# Patient Record
Sex: FEMALE | Race: WHITE | Employment: FULL TIME
[De-identification: names, ages, dates, MRNs, and addresses within clinical notes are randomized per-mention and may not be internally consistent; named-entity substitution may affect disease eponyms.]

---

## 2017-02-27 ENCOUNTER — OFFICE VISIT (OUTPATIENT)
Dept: OBGYN | Facility: CLINIC | Age: 37
End: 2017-02-27

## 2017-02-27 VITALS
DIASTOLIC BLOOD PRESSURE: 74 MMHG | SYSTOLIC BLOOD PRESSURE: 110 MMHG | HEART RATE: 76 BPM | BODY MASS INDEX: 31.32 KG/M2 | HEIGHT: 65 IN | RESPIRATION RATE: 14 BRPM | WEIGHT: 188 LBS

## 2017-02-27 DIAGNOSIS — Z11.51 SPECIAL SCREENING EXAMINATION FOR HUMAN PAPILLOMAVIRUS (HPV): ICD-10-CM

## 2017-02-27 DIAGNOSIS — Z01.419 ENCOUNTER FOR GYNECOLOGICAL EXAMINATION (GENERAL) (ROUTINE) WITHOUT ABNORMAL FINDINGS: ICD-10-CM

## 2017-02-27 DIAGNOSIS — Z01.419 WELL WOMAN EXAM WITH ROUTINE GYNECOLOGICAL EXAM: Primary | ICD-10-CM

## 2017-02-27 PROCEDURE — 99395 PREV VISIT EST AGE 18-39: CPT | Performed by: ADVANCED PRACTICE MIDWIFE

## 2017-02-27 ASSESSMENT — PATIENT HEALTH QUESTIONNAIRE - PHQ9
1. LITTLE INTEREST OR PLEASURE IN DOING THINGS: 0
SUM OF ALL RESPONSES TO PHQ9 QUESTIONS 1 & 2: 0
SUM OF ALL RESPONSES TO PHQ QUESTIONS 1-9: 0
2. FEELING DOWN, DEPRESSED OR HOPELESS: 0

## 2017-03-09 ENCOUNTER — TELEPHONE (OUTPATIENT)
Dept: OBGYN | Facility: CLINIC | Age: 37
End: 2017-03-09

## 2017-03-10 DIAGNOSIS — Z11.51 SPECIAL SCREENING EXAMINATION FOR HUMAN PAPILLOMAVIRUS (HPV): ICD-10-CM

## 2017-03-10 DIAGNOSIS — Z01.419 WELL WOMAN EXAM WITH ROUTINE GYNECOLOGICAL EXAM: ICD-10-CM

## 2017-03-27 ENCOUNTER — TELEPHONE (OUTPATIENT)
Dept: OBGYN CLINIC | Age: 37
End: 2017-03-27

## 2017-03-27 DIAGNOSIS — N92.6 MISSED MENSES: Primary | ICD-10-CM

## 2017-03-28 ENCOUNTER — HOSPITAL ENCOUNTER (OUTPATIENT)
Age: 37
Discharge: HOME | End: 2017-03-28
Payer: MEDICARE

## 2017-03-28 DIAGNOSIS — N92.6 MISSED MENSES: ICD-10-CM

## 2017-03-28 DIAGNOSIS — O36.80X0 ENCOUNTER TO DETERMINE FETAL VIABILITY OF PREGNANCY, NOT APPLICABLE OR UNSPECIFIED FETUS: Primary | ICD-10-CM

## 2017-03-28 LAB — HCG QUANTITATIVE: ABNORMAL IU/L

## 2017-03-28 PROCEDURE — 36415 COLL VENOUS BLD VENIPUNCTURE: CPT

## 2017-03-28 PROCEDURE — 84702 CHORIONIC GONADOTROPIN TEST: CPT

## 2017-04-03 ENCOUNTER — HOSPITAL ENCOUNTER (OUTPATIENT)
Dept: ULTRASOUND IMAGING | Age: 37
Discharge: HOME | End: 2017-04-03
Payer: MEDICARE

## 2017-04-03 DIAGNOSIS — O36.80X0 ENCOUNTER TO DETERMINE FETAL VIABILITY OF PREGNANCY, NOT APPLICABLE OR UNSPECIFIED FETUS: ICD-10-CM

## 2017-04-03 PROCEDURE — 76801 OB US < 14 WKS SINGLE FETUS: CPT

## 2017-04-19 RX ORDER — PYRIDOXINE HCL (VITAMIN B6) 50 MG
50 TABLET ORAL 2 TIMES DAILY
Qty: 60 TABLET | Refills: 1 | Status: SHIPPED | OUTPATIENT
Start: 2017-04-19 | End: 2017-05-22

## 2017-04-24 ENCOUNTER — HOSPITAL ENCOUNTER (OUTPATIENT)
Age: 37
Discharge: HOME | End: 2017-04-24
Payer: COMMERCIAL

## 2017-04-24 ENCOUNTER — HOSPITAL ENCOUNTER (OUTPATIENT)
Age: 37
Setting detail: SPECIMEN
Discharge: HOME | End: 2017-04-24
Payer: COMMERCIAL

## 2017-04-24 ENCOUNTER — TELEPHONE (OUTPATIENT)
Dept: OBGYN CLINIC | Age: 37
End: 2017-04-24

## 2017-04-24 ENCOUNTER — INITIAL PRENATAL (OUTPATIENT)
Dept: OBGYN CLINIC | Age: 37
End: 2017-04-24

## 2017-04-24 VITALS
DIASTOLIC BLOOD PRESSURE: 76 MMHG | WEIGHT: 185 LBS | BODY MASS INDEX: 30.79 KG/M2 | HEART RATE: 68 BPM | SYSTOLIC BLOOD PRESSURE: 118 MMHG

## 2017-04-24 DIAGNOSIS — Z3A.16 PREGNANCY WITH 16 COMPLETED WEEKS GESTATION: Primary | ICD-10-CM

## 2017-04-24 DIAGNOSIS — O09.529 AMA (ADVANCED MATERNAL AGE) MULTIGRAVIDA 35+, UNSPECIFIED TRIMESTER: ICD-10-CM

## 2017-04-24 DIAGNOSIS — Z87.59 HISTORY OF MISCARRIAGE: ICD-10-CM

## 2017-04-24 DIAGNOSIS — O09.91 HRP (HIGH RISK PREGNANCY), FIRST TRIMESTER: Primary | ICD-10-CM

## 2017-04-24 DIAGNOSIS — Z3A.10 10 WEEKS GESTATION OF PREGNANCY: ICD-10-CM

## 2017-04-24 DIAGNOSIS — R87.810 CERVICAL HIGH RISK HPV (HUMAN PAPILLOMAVIRUS) TEST POSITIVE: ICD-10-CM

## 2017-04-24 DIAGNOSIS — Z32.01 POSITIVE PREGNANCY TEST: ICD-10-CM

## 2017-04-24 DIAGNOSIS — R87.612 LGSIL ON PAP SMEAR OF CERVIX: ICD-10-CM

## 2017-04-24 DIAGNOSIS — O09.91 HRP (HIGH RISK PREGNANCY), FIRST TRIMESTER: ICD-10-CM

## 2017-04-24 PROBLEM — R79.89 LOW THYROID STIMULATING HORMONE (TSH) LEVEL: Status: ACTIVE | Noted: 2017-04-24

## 2017-04-24 LAB
-: ABNORMAL
ABO/RH: NORMAL
ABSOLUTE EOS #: 0 K/UL (ref 0–0.4)
ABSOLUTE LYMPH #: 0.9 K/UL (ref 1–4.8)
ABSOLUTE MONO #: 0.4 K/UL (ref 0.1–1.3)
AMORPHOUS: ABNORMAL
ANTIBODY SCREEN: NEGATIVE
BACTERIA: ABNORMAL
BASOPHILS # BLD: 0 %
BASOPHILS ABSOLUTE: 0 K/UL (ref 0–0.2)
BILIRUBIN URINE: ABNORMAL
CASTS UA: ABNORMAL /LPF
COLOR: ABNORMAL
COMMENT UA: ABNORMAL
CONTROL: ABNORMAL
CRYSTALS, UA: ABNORMAL /HPF
DIFFERENTIAL TYPE: ABNORMAL
EOSINOPHILS RELATIVE PERCENT: 0 %
EPITHELIAL CELLS UA: ABNORMAL /HPF
GLUCOSE BLD-MCNC: 109 MG/DL (ref 70–99)
GLUCOSE URINE: NEGATIVE
HCT VFR BLD CALC: 39.1 % (ref 36–46)
HEMOGLOBIN: 13.1 G/DL (ref 12–16)
HEPATITIS B SURFACE ANTIGEN: NONREACTIVE
HIV AG/AB: NONREACTIVE
KETONES, URINE: ABNORMAL
LEUKOCYTE ESTERASE, URINE: ABNORMAL
LYMPHOCYTES # BLD: 17 %
MCH RBC QN AUTO: 28.6 PG (ref 26–34)
MCHC RBC AUTO-ENTMCNC: 33.5 G/DL (ref 31–37)
MCV RBC AUTO: 85.5 FL (ref 80–100)
MONOCYTES # BLD: 6 %
MUCUS: ABNORMAL
NITRITE, URINE: NEGATIVE
OTHER OBSERVATIONS UA: ABNORMAL
PDW BLD-RTO: 13.9 % (ref 11.5–14.9)
PH UA: 5.5 (ref 5–8)
PLATELET # BLD: 187 K/UL (ref 150–450)
PLATELET ESTIMATE: ABNORMAL
PMV BLD AUTO: 8.1 FL (ref 6–12)
PREGNANCY TEST URINE, POC: POSITIVE
PROTEIN UA: ABNORMAL
RBC # BLD: 4.57 M/UL (ref 4–5.2)
RBC # BLD: ABNORMAL 10*6/UL
RBC UA: ABNORMAL /HPF
RENAL EPITHELIAL, UA: ABNORMAL /HPF
RUBV IGG SER QL: 152.2 IU/ML
SEG NEUTROPHILS: 77 %
SEGMENTED NEUTROPHILS ABSOLUTE COUNT: 4.2 K/UL (ref 1.3–9.1)
SPECIFIC GRAVITY UA: 1.03 (ref 1–1.03)
THYROXINE, FREE: 1.28 NG/DL (ref 0.93–1.7)
TRICHOMONAS: ABNORMAL
TSH SERPL DL<=0.05 MIU/L-ACNC: 0.24 MIU/L (ref 0.3–5)
TURBIDITY: ABNORMAL
URINE HGB: ABNORMAL
UROBILINOGEN, URINE: NORMAL
WBC # BLD: 5.5 K/UL (ref 3.5–11)
WBC # BLD: ABNORMAL 10*3/UL
WBC UA: ABNORMAL /HPF
YEAST: ABNORMAL

## 2017-04-24 PROCEDURE — 86901 BLOOD TYPING SEROLOGIC RH(D): CPT

## 2017-04-24 PROCEDURE — 87086 URINE CULTURE/COLONY COUNT: CPT

## 2017-04-24 PROCEDURE — 0500F INITIAL PRENATAL CARE VISIT: CPT | Performed by: NURSE PRACTITIONER

## 2017-04-24 PROCEDURE — 86762 RUBELLA ANTIBODY: CPT

## 2017-04-24 PROCEDURE — 87491 CHLMYD TRACH DNA AMP PROBE: CPT

## 2017-04-24 PROCEDURE — 82947 ASSAY GLUCOSE BLOOD QUANT: CPT

## 2017-04-24 PROCEDURE — 85025 COMPLETE CBC W/AUTO DIFF WBC: CPT

## 2017-04-24 PROCEDURE — 87389 HIV-1 AG W/HIV-1&-2 AB AG IA: CPT

## 2017-04-24 PROCEDURE — 84443 ASSAY THYROID STIM HORMONE: CPT

## 2017-04-24 PROCEDURE — 86850 RBC ANTIBODY SCREEN: CPT

## 2017-04-24 PROCEDURE — 87591 N.GONORRHOEAE DNA AMP PROB: CPT

## 2017-04-24 PROCEDURE — 87340 HEPATITIS B SURFACE AG IA: CPT

## 2017-04-24 PROCEDURE — 87070 CULTURE OTHR SPECIMN AEROBIC: CPT

## 2017-04-24 PROCEDURE — 84439 ASSAY OF FREE THYROXINE: CPT

## 2017-04-24 PROCEDURE — 36415 COLL VENOUS BLD VENIPUNCTURE: CPT

## 2017-04-24 PROCEDURE — 86900 BLOOD TYPING SEROLOGIC ABO: CPT

## 2017-04-24 PROCEDURE — 81001 URINALYSIS AUTO W/SCOPE: CPT

## 2017-04-24 RX ORDER — PROMETHAZINE HYDROCHLORIDE 25 MG/1
12.5 TABLET ORAL EVERY 6 HOURS PRN
Qty: 30 TABLET | Refills: 0 | Status: SHIPPED | OUTPATIENT
Start: 2017-04-24 | End: 2017-05-01

## 2017-04-24 RX ORDER — DOXYLAMINE SUCCINATE AND PYRIDOXINE HYDROCHLORIDE, DELAYED RELEASE TABLETS 10 MG/10 MG 10; 10 MG/1; MG/1
2 TABLET, DELAYED RELEASE ORAL 4 TIMES DAILY
Qty: 120 TABLET | Refills: 0 | Status: SHIPPED | OUTPATIENT
Start: 2017-04-24 | End: 2017-05-22

## 2017-04-24 RX ORDER — DOCUSATE SODIUM 100 MG/1
100 CAPSULE, LIQUID FILLED ORAL DAILY PRN
Qty: 30 CAPSULE | Refills: 2 | Status: SHIPPED | OUTPATIENT
Start: 2017-04-24 | End: 2017-05-22

## 2017-04-25 LAB
C TRACH DNA GENITAL QL NAA+PROBE: NEGATIVE
CULTURE: NORMAL
CULTURE: NORMAL
Lab: NORMAL
N. GONORRHOEAE DNA: NEGATIVE
SPECIMEN DESCRIPTION: NORMAL
SPECIMEN DESCRIPTION: NORMAL
STATUS: NORMAL

## 2017-04-27 LAB
CULTURE: NORMAL
Lab: NORMAL
SPECIMEN DESCRIPTION: NORMAL
SPECIMEN DESCRIPTION: NORMAL
STATUS: NORMAL

## 2017-05-11 ENCOUNTER — HOSPITAL ENCOUNTER (OUTPATIENT)
Age: 37
Discharge: HOME | End: 2017-05-11
Payer: MEDICARE

## 2017-05-11 ENCOUNTER — ROUTINE PRENATAL (OUTPATIENT)
Dept: PERINATAL CARE | Age: 37
End: 2017-05-11
Payer: COMMERCIAL

## 2017-05-11 VITALS
HEIGHT: 65 IN | RESPIRATION RATE: 16 BRPM | TEMPERATURE: 98.4 F | DIASTOLIC BLOOD PRESSURE: 76 MMHG | WEIGHT: 187 LBS | SYSTOLIC BLOOD PRESSURE: 112 MMHG | HEART RATE: 76 BPM | BODY MASS INDEX: 31.16 KG/M2

## 2017-05-11 DIAGNOSIS — O36.80X0 EXAMINATION TO DETERMINE FETAL VIABILITY OF PREGNANCY, NOT APPLICABLE OR UNSPECIFIED FETUS: ICD-10-CM

## 2017-05-11 DIAGNOSIS — Z3A.13 13 WEEKS GESTATION OF PREGNANCY: ICD-10-CM

## 2017-05-11 DIAGNOSIS — Z36.9 FIRST TRIMESTER SCREENING: ICD-10-CM

## 2017-05-11 DIAGNOSIS — O09.521 AMA (ADVANCED MATERNAL AGE) MULTIGRAVIDA 35+, FIRST TRIMESTER: Primary | ICD-10-CM

## 2017-05-11 PROCEDURE — 76813 OB US NUCHAL MEAS 1 GEST: CPT | Performed by: OBSTETRICS & GYNECOLOGY

## 2017-05-11 PROCEDURE — 99242 OFF/OP CONSLTJ NEW/EST SF 20: CPT | Performed by: OBSTETRICS & GYNECOLOGY

## 2017-05-11 PROCEDURE — 76801 OB US < 14 WKS SINGLE FETUS: CPT | Performed by: OBSTETRICS & GYNECOLOGY

## 2017-05-12 LAB
SEND OUT REPORT: NORMAL
TEST NAME: NORMAL

## 2017-05-22 ENCOUNTER — ROUTINE PRENATAL (OUTPATIENT)
Dept: OBGYN CLINIC | Age: 37
End: 2017-05-22

## 2017-05-22 VITALS
BODY MASS INDEX: 30.62 KG/M2 | WEIGHT: 184 LBS | HEART RATE: 72 BPM | DIASTOLIC BLOOD PRESSURE: 70 MMHG | SYSTOLIC BLOOD PRESSURE: 116 MMHG

## 2017-05-22 DIAGNOSIS — Z3A.14 14 WEEKS GESTATION OF PREGNANCY: Primary | ICD-10-CM

## 2017-05-22 PROCEDURE — 0502F SUBSEQUENT PRENATAL CARE: CPT | Performed by: ADVANCED PRACTICE MIDWIFE

## 2017-05-30 ENCOUNTER — HOSPITAL ENCOUNTER (OUTPATIENT)
Age: 37
Discharge: HOME | End: 2017-05-30
Payer: COMMERCIAL

## 2017-05-30 DIAGNOSIS — Z3A.14 14 WEEKS GESTATION OF PREGNANCY: ICD-10-CM

## 2017-05-30 PROCEDURE — 82105 ALPHA-FETOPROTEIN SERUM: CPT

## 2017-05-30 PROCEDURE — 36415 COLL VENOUS BLD VENIPUNCTURE: CPT

## 2017-06-02 LAB
AFP INTERPRETATION: NORMAL
AFP MOM: 0.63
AFP SPECIMEN: NORMAL
AFP: 17 NG/ML
DATE OF BIRTH: NORMAL
DATING METHOD: NORMAL
DETERMINED BY: NORMAL
DIABETIC: NO
DUE DATE: NORMAL
ESTIMATED DUE DATE: NORMAL
FAMILY HISTORY NTD: NO
GESTATIONAL AGE: 15.71 WEEKS
HISTORY OF ANEUPLOIDY?: NO
INSULIN REQ DIABETES: NO
LAST MENSTRUAL PERIOD: NORMAL
MATERNAL AGE AT EDD: 36.3 YR
MATERNAL WEIGHT: 184
NUMBER OF FETUSES: NORMAL
PATIENT WEIGHT: 184 LBS
PHYSICIAN: NORMAL
RACE (MATERNAL): NORMAL
RACE: NORMAL
ZZ NTE CLEAN UP: HISTORY: NO

## 2017-06-19 ENCOUNTER — ROUTINE PRENATAL (OUTPATIENT)
Dept: OBGYN CLINIC | Age: 37
End: 2017-06-19
Payer: COMMERCIAL

## 2017-06-19 VITALS
DIASTOLIC BLOOD PRESSURE: 70 MMHG | SYSTOLIC BLOOD PRESSURE: 114 MMHG | BODY MASS INDEX: 31.62 KG/M2 | HEART RATE: 74 BPM | WEIGHT: 190 LBS

## 2017-06-19 DIAGNOSIS — Z3A.18 18 WEEKS GESTATION OF PREGNANCY: Primary | ICD-10-CM

## 2017-06-19 PROCEDURE — 99213 OFFICE O/P EST LOW 20 MIN: CPT | Performed by: ADVANCED PRACTICE MIDWIFE

## 2017-06-29 ENCOUNTER — ROUTINE PRENATAL (OUTPATIENT)
Dept: PERINATAL CARE | Age: 37
End: 2017-06-29
Payer: COMMERCIAL

## 2017-06-29 VITALS
BODY MASS INDEX: 31.95 KG/M2 | WEIGHT: 192 LBS | TEMPERATURE: 98.4 F | DIASTOLIC BLOOD PRESSURE: 78 MMHG | RESPIRATION RATE: 16 BRPM | HEART RATE: 72 BPM | SYSTOLIC BLOOD PRESSURE: 118 MMHG

## 2017-06-29 DIAGNOSIS — O28.9 ABNORMAL FIRST TRIMESTER SCREEN: ICD-10-CM

## 2017-06-29 DIAGNOSIS — O09.522 AMA (ADVANCED MATERNAL AGE) MULTIGRAVIDA 35+, SECOND TRIMESTER: Primary | ICD-10-CM

## 2017-06-29 DIAGNOSIS — O44.02 LOW IMPLANTATION OF PLACENTA WITHOUT HEMORRHAGE, SECOND TRIMESTER: ICD-10-CM

## 2017-06-29 DIAGNOSIS — Z36.86 ENCOUNTER FOR SCREENING FOR RISK OF PRE-TERM LABOR: ICD-10-CM

## 2017-06-29 PROBLEM — O44.40 LOW IMPLANTATION OF PLACENTA WITHOUT HEMORRHAGE: Status: ACTIVE | Noted: 2017-06-29

## 2017-06-29 PROCEDURE — 76817 TRANSVAGINAL US OBSTETRIC: CPT | Performed by: OBSTETRICS & GYNECOLOGY

## 2017-06-29 PROCEDURE — 76811 OB US DETAILED SNGL FETUS: CPT | Performed by: OBSTETRICS & GYNECOLOGY

## 2017-06-30 PROBLEM — O28.9 ABNORMAL FIRST TRIMESTER SCREEN: Status: ACTIVE | Noted: 2017-06-30

## 2017-07-06 ENCOUNTER — TELEPHONE (OUTPATIENT)
Dept: OBGYN CLINIC | Age: 37
End: 2017-07-06

## 2017-07-20 ENCOUNTER — ROUTINE PRENATAL (OUTPATIENT)
Dept: OBGYN CLINIC | Age: 37
End: 2017-07-20
Payer: COMMERCIAL

## 2017-07-20 VITALS
BODY MASS INDEX: 31.12 KG/M2 | DIASTOLIC BLOOD PRESSURE: 80 MMHG | WEIGHT: 187 LBS | SYSTOLIC BLOOD PRESSURE: 118 MMHG | HEART RATE: 68 BPM

## 2017-07-20 DIAGNOSIS — Z98.890 HISTORY OF INDUCED ABORTION: ICD-10-CM

## 2017-07-20 DIAGNOSIS — O44.00: ICD-10-CM

## 2017-07-20 DIAGNOSIS — Z67.91 RH NEGATIVE STATUS DURING PREGNANCY, UNSPECIFIED TRIMESTER: ICD-10-CM

## 2017-07-20 DIAGNOSIS — O09.92 HRP (HIGH RISK PREGNANCY), SECOND TRIMESTER: Primary | ICD-10-CM

## 2017-07-20 DIAGNOSIS — Z3A.23 23 WEEKS GESTATION OF PREGNANCY: ICD-10-CM

## 2017-07-20 DIAGNOSIS — Z87.59 HISTORY OF MISCARRIAGE: ICD-10-CM

## 2017-07-20 DIAGNOSIS — O26.899 RH NEGATIVE STATUS DURING PREGNANCY, UNSPECIFIED TRIMESTER: ICD-10-CM

## 2017-07-20 DIAGNOSIS — R87.612 LGSIL ON PAP SMEAR OF CERVIX: ICD-10-CM

## 2017-07-20 DIAGNOSIS — O28.9 ABNORMAL FIRST TRIMESTER SCREEN: ICD-10-CM

## 2017-07-20 PROCEDURE — 99213 OFFICE O/P EST LOW 20 MIN: CPT | Performed by: ADVANCED PRACTICE MIDWIFE

## 2017-07-27 ENCOUNTER — ROUTINE PRENATAL (OUTPATIENT)
Dept: PERINATAL CARE | Age: 37
End: 2017-07-27
Payer: COMMERCIAL

## 2017-07-27 VITALS
BODY MASS INDEX: 32.99 KG/M2 | HEIGHT: 65 IN | SYSTOLIC BLOOD PRESSURE: 118 MMHG | TEMPERATURE: 98 F | DIASTOLIC BLOOD PRESSURE: 82 MMHG | WEIGHT: 198 LBS | RESPIRATION RATE: 16 BRPM | HEART RATE: 91 BPM

## 2017-07-27 DIAGNOSIS — Z3A.24 24 WEEKS GESTATION OF PREGNANCY: ICD-10-CM

## 2017-07-27 DIAGNOSIS — O28.9 ABNORMAL FIRST TRIMESTER SCREEN: Primary | ICD-10-CM

## 2017-07-27 DIAGNOSIS — O35.8XX0 SUSPECTED DAMAGE TO FETUS FROM OTHER DISEASE IN MOTHER, AFFECTING MANAGEMENT OF MOTHER, ANTEPARTUM CONDITION OR COMPLICATION, NOT APPLICABLE OR UNSPECIFIED FETUS: ICD-10-CM

## 2017-07-27 DIAGNOSIS — O40.2XX0 POLYHYDRAMNIOS, ANTEPARTUM COMPLICATION, SECOND TRIMESTER, NOT APPLICABLE OR UNSPECIFIED FETUS: ICD-10-CM

## 2017-07-27 DIAGNOSIS — O44.02 LOW IMPLANTATION OF PLACENTA WITHOUT HEMORRHAGE, SECOND TRIMESTER: ICD-10-CM

## 2017-07-27 DIAGNOSIS — Z03.72 SUSPECTED PLACENTAL PROBLEM NOT FOUND: ICD-10-CM

## 2017-07-27 DIAGNOSIS — O09.522 AMA (ADVANCED MATERNAL AGE) MULTIGRAVIDA 35+, SECOND TRIMESTER: ICD-10-CM

## 2017-07-27 DIAGNOSIS — Z36.4 ANTENATAL SCREENING FOR FETAL GROWTH RETARDATION USING ULTRASONICS: ICD-10-CM

## 2017-07-27 PROBLEM — O40.9XX0 POLYHYDRAMNIOS, ANTEPARTUM COMPLICATION: Status: ACTIVE | Noted: 2017-07-27

## 2017-07-27 PROCEDURE — 76820 UMBILICAL ARTERY ECHO: CPT | Performed by: OBSTETRICS & GYNECOLOGY

## 2017-07-27 PROCEDURE — 76816 OB US FOLLOW-UP PER FETUS: CPT | Performed by: OBSTETRICS & GYNECOLOGY

## 2017-07-27 PROCEDURE — 76817 TRANSVAGINAL US OBSTETRIC: CPT | Performed by: OBSTETRICS & GYNECOLOGY

## 2017-07-27 PROCEDURE — 76825 ECHO EXAM OF FETAL HEART: CPT | Performed by: OBSTETRICS & GYNECOLOGY

## 2017-07-27 PROCEDURE — 93325 DOPPLER ECHO COLOR FLOW MAPG: CPT | Performed by: OBSTETRICS & GYNECOLOGY

## 2017-07-27 PROCEDURE — 76827 ECHO EXAM OF FETAL HEART: CPT | Performed by: OBSTETRICS & GYNECOLOGY

## 2017-07-28 ENCOUNTER — HOSPITAL ENCOUNTER (OUTPATIENT)
Age: 37
Discharge: HOME | End: 2017-07-28
Payer: COMMERCIAL

## 2017-07-28 LAB
GLUCOSE ADMINISTRATION: NORMAL
GLUCOSE TOLERANCE SCREEN 50G: 132 MG/DL (ref 70–135)
TOXOPLASM IGM: 0.25 INDEX
TOXOPLASMA BLOOD FOR RATIO: <0.5 IU/ML

## 2017-07-28 PROCEDURE — 86777 TOXOPLASMA ANTIBODY: CPT

## 2017-07-28 PROCEDURE — 86778 TOXOPLASMA ANTIBODY IGM: CPT

## 2017-07-28 PROCEDURE — 36415 COLL VENOUS BLD VENIPUNCTURE: CPT

## 2017-07-28 PROCEDURE — 86747 PARVOVIRUS ANTIBODY: CPT

## 2017-07-28 PROCEDURE — 86645 CMV ANTIBODY IGM: CPT

## 2017-07-28 PROCEDURE — 86644 CMV ANTIBODY: CPT

## 2017-07-28 PROCEDURE — 86658 ENTEROVIRUS ANTIBODY: CPT

## 2017-07-28 PROCEDURE — 82950 GLUCOSE TEST: CPT

## 2017-07-29 ENCOUNTER — APPOINTMENT (OUTPATIENT)
Dept: ULTRASOUND IMAGING | Age: 37
End: 2017-07-29
Payer: COMMERCIAL

## 2017-07-29 ENCOUNTER — HOSPITAL ENCOUNTER (OUTPATIENT)
Age: 37
Discharge: HOME | End: 2017-07-29
Attending: OBSTETRICS & GYNECOLOGY | Admitting: OBSTETRICS & GYNECOLOGY
Payer: COMMERCIAL

## 2017-07-29 VITALS
DIASTOLIC BLOOD PRESSURE: 79 MMHG | TEMPERATURE: 97.7 F | WEIGHT: 198 LBS | HEIGHT: 65 IN | RESPIRATION RATE: 16 BRPM | BODY MASS INDEX: 32.99 KG/M2 | HEART RATE: 73 BPM | SYSTOLIC BLOOD PRESSURE: 121 MMHG

## 2017-07-29 DIAGNOSIS — R10.9 ABDOMINAL PAIN AFFECTING PREGNANCY: Primary | ICD-10-CM

## 2017-07-29 DIAGNOSIS — O26.899 ABDOMINAL PAIN AFFECTING PREGNANCY: Primary | ICD-10-CM

## 2017-07-29 PROBLEM — K85.90 PANCREATITIS: Status: ACTIVE | Noted: 2017-07-29

## 2017-07-29 PROBLEM — A59.01 TRICHOMONAS VAGINITIS: Status: ACTIVE | Noted: 2017-07-29

## 2017-07-29 LAB
-: ABNORMAL
ABSOLUTE EOS #: 0 K/UL (ref 0–0.4)
ABSOLUTE LYMPH #: 1.3 K/UL (ref 1–4.8)
ABSOLUTE MONO #: 0.5 K/UL (ref 0.1–1.3)
ALBUMIN SERPL-MCNC: 3.3 G/DL (ref 3.5–5.2)
ALBUMIN/GLOBULIN RATIO: ABNORMAL (ref 1–2.5)
ALP BLD-CCNC: 85 U/L (ref 35–104)
ALT SERPL-CCNC: 9 U/L (ref 5–33)
AMORPHOUS: ABNORMAL
AMYLASE: 134 U/L (ref 28–100)
ANION GAP SERPL CALCULATED.3IONS-SCNC: 14 MMOL/L (ref 9–17)
AST SERPL-CCNC: 12 U/L
BACTERIA: ABNORMAL
BASOPHILS # BLD: 0 %
BASOPHILS ABSOLUTE: 0 K/UL (ref 0–0.2)
BILIRUB SERPL-MCNC: 0.27 MG/DL (ref 0.3–1.2)
BILIRUBIN URINE: NEGATIVE
BUN BLDV-MCNC: 5 MG/DL (ref 6–20)
BUN/CREAT BLD: ABNORMAL (ref 9–20)
CALCIUM SERPL-MCNC: 8.8 MG/DL (ref 8.6–10.4)
CASTS UA: ABNORMAL /LPF
CHLORIDE BLD-SCNC: 102 MMOL/L (ref 98–107)
CO2: 22 MMOL/L (ref 20–31)
COLOR: YELLOW
COMMENT UA: ABNORMAL
CREAT SERPL-MCNC: 0.51 MG/DL (ref 0.5–0.9)
CRYSTALS, UA: ABNORMAL /HPF
DIFFERENTIAL TYPE: NORMAL
DIRECT EXAM: ABNORMAL
EOSINOPHILS RELATIVE PERCENT: 0 %
EPITHELIAL CELLS UA: ABNORMAL /HPF
FETAL FIBRONECTIN APPEARANCE: NORMAL
FETAL FIBRONECTIN: NEGATIVE
GFR AFRICAN AMERICAN: >60 ML/MIN
GFR NON-AFRICAN AMERICAN: >60 ML/MIN
GFR SERPL CREATININE-BSD FRML MDRD: ABNORMAL ML/MIN/{1.73_M2}
GFR SERPL CREATININE-BSD FRML MDRD: ABNORMAL ML/MIN/{1.73_M2}
GLUCOSE BLD-MCNC: 88 MG/DL (ref 70–99)
GLUCOSE URINE: NEGATIVE
HCT VFR BLD CALC: 37.7 % (ref 36–46)
HEMOGLOBIN: 12.6 G/DL (ref 12–16)
KETONES, URINE: NEGATIVE
LEUKOCYTE ESTERASE, URINE: ABNORMAL
LIPASE: 106 U/L (ref 13–60)
LYMPHOCYTES # BLD: 14 %
Lab: ABNORMAL
MCH RBC QN AUTO: 30.4 PG (ref 26–34)
MCHC RBC AUTO-ENTMCNC: 33.4 G/DL (ref 31–37)
MCV RBC AUTO: 91 FL (ref 80–100)
MONOCYTES # BLD: 5 %
MUCUS: ABNORMAL
NITRITE, URINE: NEGATIVE
OTHER OBSERVATIONS UA: ABNORMAL
PDW BLD-RTO: 14.7 % (ref 11.5–14.9)
PH UA: 6 (ref 5–8)
PLATELET # BLD: 213 K/UL (ref 150–450)
PLATELET ESTIMATE: NORMAL
PMV BLD AUTO: 7.4 FL (ref 6–12)
POTASSIUM SERPL-SCNC: 3.7 MMOL/L (ref 3.7–5.3)
PROTEIN UA: ABNORMAL
RBC # BLD: 4.14 M/UL (ref 4–5.2)
RBC # BLD: NORMAL 10*6/UL
RBC UA: ABNORMAL /HPF
RENAL EPITHELIAL, UA: ABNORMAL /HPF
SEG NEUTROPHILS: 81 %
SEGMENTED NEUTROPHILS ABSOLUTE COUNT: 7.6 K/UL (ref 1.3–9.1)
SODIUM BLD-SCNC: 138 MMOL/L (ref 135–144)
SPECIFIC GRAVITY UA: 1.02 (ref 1–1.03)
SPECIMEN DESCRIPTION: ABNORMAL
SPECIMEN DESCRIPTION: ABNORMAL
STATUS: ABNORMAL
TOTAL PROTEIN: 6.6 G/DL (ref 6.4–8.3)
TRICHOMONAS: ABNORMAL
TURBIDITY: ABNORMAL
URINE HGB: ABNORMAL
UROBILINOGEN, URINE: NORMAL
WBC # BLD: 9.5 K/UL (ref 3.5–11)
WBC # BLD: NORMAL 10*3/UL
WBC UA: ABNORMAL /HPF
YEAST: ABNORMAL

## 2017-07-29 PROCEDURE — 76705 ECHO EXAM OF ABDOMEN: CPT

## 2017-07-29 PROCEDURE — 80053 COMPREHEN METABOLIC PANEL: CPT

## 2017-07-29 PROCEDURE — 87510 GARDNER VAG DNA DIR PROBE: CPT

## 2017-07-29 PROCEDURE — 82731 ASSAY OF FETAL FIBRONECTIN: CPT

## 2017-07-29 PROCEDURE — 6370000000 HC RX 637 (ALT 250 FOR IP): Performed by: OBSTETRICS & GYNECOLOGY

## 2017-07-29 PROCEDURE — 81001 URINALYSIS AUTO W/SCOPE: CPT

## 2017-07-29 PROCEDURE — 36415 COLL VENOUS BLD VENIPUNCTURE: CPT

## 2017-07-29 PROCEDURE — 83690 ASSAY OF LIPASE: CPT

## 2017-07-29 PROCEDURE — 85025 COMPLETE CBC W/AUTO DIFF WBC: CPT

## 2017-07-29 PROCEDURE — 87660 TRICHOMONAS VAGIN DIR PROBE: CPT

## 2017-07-29 PROCEDURE — 87491 CHLMYD TRACH DNA AMP PROBE: CPT

## 2017-07-29 PROCEDURE — 87591 N.GONORRHOEAE DNA AMP PROB: CPT

## 2017-07-29 PROCEDURE — 82150 ASSAY OF AMYLASE: CPT

## 2017-07-29 PROCEDURE — 87086 URINE CULTURE/COLONY COUNT: CPT

## 2017-07-29 PROCEDURE — 87480 CANDIDA DNA DIR PROBE: CPT

## 2017-07-29 RX ORDER — METRONIDAZOLE 500 MG/1
500 TABLET ORAL 2 TIMES DAILY
Qty: 14 TABLET | Refills: 0 | Status: SHIPPED | OUTPATIENT
Start: 2017-07-29 | End: 2017-08-05

## 2017-07-29 RX ORDER — METRONIDAZOLE 500 MG/1
2000 TABLET ORAL ONCE
Status: COMPLETED | OUTPATIENT
Start: 2017-07-29 | End: 2017-07-29

## 2017-07-29 RX ORDER — FLUCONAZOLE 150 MG/1
150 TABLET ORAL ONCE
Status: COMPLETED | OUTPATIENT
Start: 2017-07-29 | End: 2017-07-29

## 2017-07-29 RX ORDER — METRONIDAZOLE 500 MG/1
500 TABLET ORAL 2 TIMES DAILY
Qty: 20 TABLET | Refills: 0 | Status: SHIPPED | OUTPATIENT
Start: 2017-07-29 | End: 2017-07-29

## 2017-07-29 RX ADMIN — FLUCONAZOLE 150 MG: 150 TABLET ORAL at 10:36

## 2017-07-29 RX ADMIN — METRONIDAZOLE 2000 MG: 500 TABLET ORAL at 10:37

## 2017-07-30 LAB
CULTURE: NORMAL
CULTURE: NORMAL
Lab: NORMAL
PARVOVIRUS B19 IGG ANTIBODY: 7.17 IV
PARVOVIRUS B19 IGM ANTIBODY: 0.13 IV
SPECIMEN DESCRIPTION: NORMAL
SPECIMEN DESCRIPTION: NORMAL
STATUS: NORMAL

## 2017-07-31 PROBLEM — O44.40 LOW IMPLANTATION OF PLACENTA WITHOUT HEMORRHAGE: Status: RESOLVED | Noted: 2017-06-29 | Resolved: 2017-07-31

## 2017-07-31 LAB
COXSACKIE A9 AB: NORMAL
CYTOMEGALOVIRUS IGG ANTIBODY: 0.1

## 2017-08-01 LAB
C TRACH DNA GENITAL QL NAA+PROBE: NEGATIVE
N. GONORRHOEAE DNA: NEGATIVE

## 2017-08-03 LAB — CMV IGM: 0.2

## 2017-08-06 LAB
COXSACKIE B1 ANTIBODY: NORMAL
COXSACKIE B2 ANTIBODY: NORMAL
COXSACKIE B3 ANTIBODY: NORMAL
COXSACKIE B4 ANTIBODY: NORMAL
COXSACKIE B5 ANTIBODY: NORMAL
COXSACKIE B6 ANTIBODY: NORMAL

## 2017-08-09 ENCOUNTER — HOSPITAL ENCOUNTER (OUTPATIENT)
Age: 37
Discharge: HOME | End: 2017-08-09
Payer: COMMERCIAL

## 2017-08-09 LAB
ALBUMIN SERPL-MCNC: 3.4 G/DL (ref 3.5–5.2)
ALBUMIN/GLOBULIN RATIO: ABNORMAL (ref 1–2.5)
ALP BLD-CCNC: 86 U/L (ref 35–104)
ALT SERPL-CCNC: 10 U/L (ref 5–33)
ANION GAP SERPL CALCULATED.3IONS-SCNC: 15 MMOL/L (ref 9–17)
AST SERPL-CCNC: 12 U/L
BILIRUB SERPL-MCNC: 0.31 MG/DL (ref 0.3–1.2)
BUN BLDV-MCNC: 5 MG/DL (ref 6–20)
BUN/CREAT BLD: ABNORMAL (ref 9–20)
CALCIUM SERPL-MCNC: 9 MG/DL (ref 8.6–10.4)
CHLORIDE BLD-SCNC: 99 MMOL/L (ref 98–107)
CO2: 24 MMOL/L (ref 20–31)
CREAT SERPL-MCNC: 0.5 MG/DL (ref 0.5–0.9)
GFR AFRICAN AMERICAN: >60 ML/MIN
GFR NON-AFRICAN AMERICAN: >60 ML/MIN
GFR SERPL CREATININE-BSD FRML MDRD: ABNORMAL ML/MIN/{1.73_M2}
GFR SERPL CREATININE-BSD FRML MDRD: ABNORMAL ML/MIN/{1.73_M2}
GLUCOSE BLD-MCNC: 93 MG/DL (ref 70–99)
LIPASE: 108 U/L (ref 13–60)
POTASSIUM SERPL-SCNC: 4.3 MMOL/L (ref 3.7–5.3)
SODIUM BLD-SCNC: 138 MMOL/L (ref 135–144)
TOTAL PROTEIN: 6.5 G/DL (ref 6.4–8.3)

## 2017-08-09 PROCEDURE — 80053 COMPREHEN METABOLIC PANEL: CPT

## 2017-08-09 PROCEDURE — 83690 ASSAY OF LIPASE: CPT

## 2017-08-09 PROCEDURE — 36415 COLL VENOUS BLD VENIPUNCTURE: CPT

## 2017-08-22 ENCOUNTER — ROUTINE PRENATAL (OUTPATIENT)
Dept: OBGYN CLINIC | Age: 37
End: 2017-08-22
Payer: COMMERCIAL

## 2017-08-22 VITALS
SYSTOLIC BLOOD PRESSURE: 118 MMHG | WEIGHT: 196 LBS | HEART RATE: 64 BPM | DIASTOLIC BLOOD PRESSURE: 72 MMHG | BODY MASS INDEX: 32.62 KG/M2

## 2017-08-22 DIAGNOSIS — A59.01 TRICHOMONAS VAGINITIS: ICD-10-CM

## 2017-08-22 DIAGNOSIS — Z98.890 HISTORY OF INDUCED ABORTION: ICD-10-CM

## 2017-08-22 DIAGNOSIS — O28.9 ABNORMAL FIRST TRIMESTER SCREEN: ICD-10-CM

## 2017-08-22 DIAGNOSIS — O26.899 RH NEGATIVE STATUS DURING PREGNANCY, UNSPECIFIED TRIMESTER: ICD-10-CM

## 2017-08-22 DIAGNOSIS — O09.92 HRP (HIGH RISK PREGNANCY), SECOND TRIMESTER: Primary | ICD-10-CM

## 2017-08-22 DIAGNOSIS — Z3A.27 27 WEEKS GESTATION OF PREGNANCY: ICD-10-CM

## 2017-08-22 DIAGNOSIS — R87.612 LGSIL ON PAP SMEAR OF CERVIX: ICD-10-CM

## 2017-08-22 DIAGNOSIS — Z87.59 HISTORY OF MISCARRIAGE: ICD-10-CM

## 2017-08-22 DIAGNOSIS — Z67.91 RH NEGATIVE STATUS DURING PREGNANCY, UNSPECIFIED TRIMESTER: ICD-10-CM

## 2017-08-22 DIAGNOSIS — Z23 NEED FOR PROPHYLACTIC VACCINATION WITH DIPHTHERIA-TETANUS-PERTUSSIS WITH POLIOMYELITIS (DTP + POLIO) VACCINE: ICD-10-CM

## 2017-08-22 PROCEDURE — 90471 IMMUNIZATION ADMIN: CPT | Performed by: OBSTETRICS & GYNECOLOGY

## 2017-08-22 PROCEDURE — 99213 OFFICE O/P EST LOW 20 MIN: CPT | Performed by: OBSTETRICS & GYNECOLOGY

## 2017-08-22 PROCEDURE — 90715 TDAP VACCINE 7 YRS/> IM: CPT | Performed by: OBSTETRICS & GYNECOLOGY

## 2017-08-24 ENCOUNTER — HOSPITAL ENCOUNTER (OUTPATIENT)
Age: 37
Discharge: HOME | End: 2017-08-24
Attending: OBSTETRICS & GYNECOLOGY | Admitting: OBSTETRICS & GYNECOLOGY
Payer: COMMERCIAL

## 2017-08-24 ENCOUNTER — HOSPITAL ENCOUNTER (OUTPATIENT)
Age: 37
Discharge: HOME | End: 2017-08-24
Payer: COMMERCIAL

## 2017-08-24 ENCOUNTER — ROUTINE PRENATAL (OUTPATIENT)
Dept: PERINATAL CARE | Age: 37
End: 2017-08-24
Payer: COMMERCIAL

## 2017-08-24 VITALS
BODY MASS INDEX: 32.95 KG/M2 | HEART RATE: 92 BPM | DIASTOLIC BLOOD PRESSURE: 74 MMHG | RESPIRATION RATE: 18 BRPM | SYSTOLIC BLOOD PRESSURE: 118 MMHG | TEMPERATURE: 97.8 F | WEIGHT: 198 LBS

## 2017-08-24 DIAGNOSIS — Z3A.28 28 WEEKS GESTATION OF PREGNANCY: ICD-10-CM

## 2017-08-24 DIAGNOSIS — Z3A.27 27 WEEKS GESTATION OF PREGNANCY: ICD-10-CM

## 2017-08-24 DIAGNOSIS — O35.8XX0 SUSPECTED DAMAGE TO FETUS FROM OTHER DISEASE IN MOTHER, AFFECTING MANAGEMENT OF MOTHER, ANTEPARTUM CONDITION OR COMPLICATION, NOT APPLICABLE OR UNSPECIFIED FETUS: ICD-10-CM

## 2017-08-24 DIAGNOSIS — O28.9 ABNORMAL FIRST TRIMESTER SCREEN: Primary | ICD-10-CM

## 2017-08-24 DIAGNOSIS — O09.523 AMA (ADVANCED MATERNAL AGE) MULTIGRAVIDA 35+, THIRD TRIMESTER: ICD-10-CM

## 2017-08-24 DIAGNOSIS — Z13.89 ENCOUNTER FOR ROUTINE SCREENING FOR MALFORMATION USING ULTRASONICS: ICD-10-CM

## 2017-08-24 DIAGNOSIS — Z36.4 ANTENATAL SCREENING FOR FETAL GROWTH RETARDATION USING ULTRASONICS: ICD-10-CM

## 2017-08-24 DIAGNOSIS — O09.92 HRP (HIGH RISK PREGNANCY), SECOND TRIMESTER: ICD-10-CM

## 2017-08-24 LAB
-: ABNORMAL
ABSOLUTE EOS #: 0 K/UL (ref 0–0.4)
ABSOLUTE LYMPH #: 1.1 K/UL (ref 1–4.8)
ABSOLUTE MONO #: 0.5 K/UL (ref 0.1–1.3)
AMORPHOUS: ABNORMAL
BACTERIA: ABNORMAL
BASOPHILS # BLD: 0 %
BASOPHILS ABSOLUTE: 0 K/UL (ref 0–0.2)
BILIRUBIN URINE: ABNORMAL
CASTS UA: ABNORMAL /LPF
COLOR: ABNORMAL
COMMENT UA: ABNORMAL
CRYSTALS, UA: ABNORMAL /HPF
DIFFERENTIAL TYPE: NORMAL
EOSINOPHILS RELATIVE PERCENT: 0 %
EPITHELIAL CELLS UA: ABNORMAL /HPF
GLUCOSE URINE: NEGATIVE
HCT VFR BLD CALC: 37.4 % (ref 36–46)
HEMOGLOBIN: 12.6 G/DL (ref 12–16)
KETONES, URINE: ABNORMAL
LEUKOCYTE ESTERASE, URINE: ABNORMAL
LYMPHOCYTES # BLD: 13 %
MCH RBC QN AUTO: 30.5 PG (ref 26–34)
MCHC RBC AUTO-ENTMCNC: 33.7 G/DL (ref 31–37)
MCV RBC AUTO: 90.6 FL (ref 80–100)
MONOCYTES # BLD: 6 %
MUCUS: ABNORMAL
NITRITE, URINE: NEGATIVE
OTHER OBSERVATIONS UA: ABNORMAL
PDW BLD-RTO: 14.9 % (ref 11.5–14.9)
PH UA: 6 (ref 5–8)
PLATELET # BLD: 215 K/UL (ref 150–450)
PLATELET ESTIMATE: NORMAL
PMV BLD AUTO: 7.1 FL (ref 6–12)
PROTEIN UA: ABNORMAL
RBC # BLD: 4.12 M/UL (ref 4–5.2)
RBC # BLD: NORMAL 10*6/UL
RBC UA: ABNORMAL /HPF
RENAL EPITHELIAL, UA: ABNORMAL /HPF
SEG NEUTROPHILS: 81 %
SEGMENTED NEUTROPHILS ABSOLUTE COUNT: 7.3 K/UL (ref 1.3–9.1)
SPECIFIC GRAVITY UA: 1.02 (ref 1–1.03)
TRICHOMONAS: ABNORMAL
TURBIDITY: ABNORMAL
URINE HGB: ABNORMAL
UROBILINOGEN, URINE: NORMAL
WBC # BLD: 9.1 K/UL (ref 3.5–11)
WBC # BLD: NORMAL 10*3/UL
WBC UA: ABNORMAL /HPF
YEAST: ABNORMAL

## 2017-08-24 PROCEDURE — 76805 OB US >/= 14 WKS SNGL FETUS: CPT | Performed by: OBSTETRICS & GYNECOLOGY

## 2017-08-24 PROCEDURE — 86901 BLOOD TYPING SEROLOGIC RH(D): CPT

## 2017-08-24 PROCEDURE — 85025 COMPLETE CBC W/AUTO DIFF WBC: CPT

## 2017-08-24 PROCEDURE — 76820 UMBILICAL ARTERY ECHO: CPT | Performed by: OBSTETRICS & GYNECOLOGY

## 2017-08-24 PROCEDURE — 86900 BLOOD TYPING SEROLOGIC ABO: CPT

## 2017-08-24 PROCEDURE — 86850 RBC ANTIBODY SCREEN: CPT

## 2017-08-24 PROCEDURE — 76819 FETAL BIOPHYS PROFIL W/O NST: CPT | Performed by: OBSTETRICS & GYNECOLOGY

## 2017-08-24 PROCEDURE — 81001 URINALYSIS AUTO W/SCOPE: CPT

## 2017-08-24 PROCEDURE — 87086 URINE CULTURE/COLONY COUNT: CPT

## 2017-08-24 PROCEDURE — 36415 COLL VENOUS BLD VENIPUNCTURE: CPT

## 2017-08-24 PROCEDURE — 96372 THER/PROPH/DIAG INJ SC/IM: CPT

## 2017-08-24 PROCEDURE — 6360000002 HC RX W HCPCS: Performed by: OBSTETRICS & GYNECOLOGY

## 2017-08-24 RX ADMIN — HUMAN RHO(D) IMMUNE GLOBULIN 300 MCG: 1500 SOLUTION INTRAMUSCULAR; INTRAVENOUS at 10:34

## 2017-08-25 LAB
ABO/RH: NORMAL
ANTIBODY SCREEN: NEGATIVE
BLD PROD TYP BPU: NORMAL
BLD PROD TYP BPU: NORMAL
CULTURE: NORMAL
CULTURE: NORMAL
DISPENSE STATUS BLOOD BANK: NORMAL
HISTORY CHECK: NORMAL
Lab: 1
Lab: NORMAL
SPECIMEN DESCRIPTION: NORMAL
SPECIMEN DESCRIPTION: NORMAL
STATUS: NORMAL
TRANSFUSION STATUS: NORMAL
UNIT DIVISION: 0
UNIT NUMBER: NORMAL

## 2017-09-20 ENCOUNTER — ROUTINE PRENATAL (OUTPATIENT)
Dept: OBGYN CLINIC | Age: 37
End: 2017-09-20

## 2017-09-20 VITALS
DIASTOLIC BLOOD PRESSURE: 72 MMHG | HEART RATE: 86 BPM | SYSTOLIC BLOOD PRESSURE: 120 MMHG | WEIGHT: 199 LBS | BODY MASS INDEX: 33.12 KG/M2

## 2017-09-20 DIAGNOSIS — Z3A.31 31 WEEKS GESTATION OF PREGNANCY: Primary | ICD-10-CM

## 2017-09-20 PROCEDURE — 0502F SUBSEQUENT PRENATAL CARE: CPT | Performed by: ADVANCED PRACTICE MIDWIFE

## 2017-09-22 ENCOUNTER — ROUTINE PRENATAL (OUTPATIENT)
Dept: PERINATAL CARE | Age: 37
End: 2017-09-22
Payer: COMMERCIAL

## 2017-09-22 VITALS
WEIGHT: 200 LBS | RESPIRATION RATE: 20 BRPM | BODY MASS INDEX: 33.32 KG/M2 | HEIGHT: 65 IN | DIASTOLIC BLOOD PRESSURE: 82 MMHG | SYSTOLIC BLOOD PRESSURE: 115 MMHG | HEART RATE: 87 BPM | TEMPERATURE: 97.9 F

## 2017-09-22 DIAGNOSIS — O28.9 ABNORMAL FIRST TRIMESTER SCREEN: Primary | ICD-10-CM

## 2017-09-22 DIAGNOSIS — Z36.4 ANTENATAL SCREENING FOR FETAL GROWTH RETARDATION USING ULTRASONICS: ICD-10-CM

## 2017-09-22 DIAGNOSIS — O35.8XX0 SUSPECTED DAMAGE TO FETUS FROM OTHER DISEASE IN MOTHER, AFFECTING MANAGEMENT OF MOTHER, ANTEPARTUM CONDITION OR COMPLICATION, NOT APPLICABLE OR UNSPECIFIED FETUS: ICD-10-CM

## 2017-09-22 DIAGNOSIS — O09.523 AMA (ADVANCED MATERNAL AGE) MULTIGRAVIDA 35+, THIRD TRIMESTER: ICD-10-CM

## 2017-09-22 DIAGNOSIS — Z3A.32 32 WEEKS GESTATION OF PREGNANCY: ICD-10-CM

## 2017-09-22 PROCEDURE — 76820 UMBILICAL ARTERY ECHO: CPT | Performed by: OBSTETRICS & GYNECOLOGY

## 2017-09-22 PROCEDURE — 76816 OB US FOLLOW-UP PER FETUS: CPT | Performed by: OBSTETRICS & GYNECOLOGY

## 2017-09-22 PROCEDURE — 76818 FETAL BIOPHYS PROFILE W/NST: CPT | Performed by: OBSTETRICS & GYNECOLOGY

## 2017-10-04 ENCOUNTER — ROUTINE PRENATAL (OUTPATIENT)
Dept: OBGYN CLINIC | Age: 37
End: 2017-10-04
Payer: COMMERCIAL

## 2017-10-04 VITALS
WEIGHT: 202 LBS | SYSTOLIC BLOOD PRESSURE: 114 MMHG | BODY MASS INDEX: 33.61 KG/M2 | HEART RATE: 97 BPM | DIASTOLIC BLOOD PRESSURE: 78 MMHG

## 2017-10-04 DIAGNOSIS — O28.9 ABNORMAL FIRST TRIMESTER SCREEN: ICD-10-CM

## 2017-10-04 DIAGNOSIS — O40.9XX9: ICD-10-CM

## 2017-10-04 DIAGNOSIS — O09.93 HRP (HIGH RISK PREGNANCY), THIRD TRIMESTER: Primary | ICD-10-CM

## 2017-10-04 PROCEDURE — 0502F SUBSEQUENT PRENATAL CARE: CPT | Performed by: ADVANCED PRACTICE MIDWIFE

## 2017-10-04 PROCEDURE — 59025 FETAL NON-STRESS TEST: CPT | Performed by: ADVANCED PRACTICE MIDWIFE

## 2017-10-04 NOTE — MR AVS SNAPSHOT
After Visit Summary             Nataliya Alegria   10/4/2017 9:00 AM   Routine Prenatal    Description:  Female : 1980   Provider:  Sue Weaver CNM; JESSY QUINTERO   Department:  [id number] [facility] Gynecology              Your Follow-Up and Future Appointments         Below is a list of your follow-up and future appointments. This may not be a complete list as you may have made appointments directly with providers that we are not aware of or your providers may have made some for you. Please call your providers to confirm appointments. It is important to keep your appointments. Please bring your current insurance card, photo ID, co-pay, and all medication bottles to your appointment. If self-pay, payment is expected at the time of service. Your To-Do List     Future Appointments Provider Department Dept Phone    10/6/2017 10:00 AM ULTRASONOGRAPHER [address] [facility] Maternal Fetal Med [phone]    10/10/2017 10:00 AM Henrique Davis DO; JESSY Rashid VIA [facility] Obstetrics & Gynecology [phone]    Patients are asked to arrive 15 minutes prior to scheduled appointment time to complete paper work. 10/17/2017 10:45 AM Henrique Davis Via [employer]  Obstetrics & Gynecology [phone]    Patients are asked to arrive 15 minutes prior to scheduled appointment time to complete paper work. 10/24/2017 10:15 AM Henrique Davis Via [employer] [facility] [facility] Obstetrics & Gynecology [phone]    Patients are asked to arrive 15 minutes prior to scheduled appointment time to complete paper work. 10/31/2017 10:45 AM Henrique Davis, Via [employer] [facility] Obstetrics & Gynecology [phone]    Patients are asked to arrive 15 minutes prior to scheduled appointment time to complete paper work.     2018 9:00 AM Sue Weaver, [address] [facility] Obstetrics & Gynecology [phone]         Information from Your Visit        Department Name Address Phone Fax    [address] [facility]. [address] [phone]      You Were Seen for:         Comments    HRP (high risk pregnancy), third trimester   [[id number]]         Vital Signs     Blood Pressure Pulse Weight Last Menstrual Period Body Mass Index Smoking Status    114/78 97 202 lb (91.6 kg) 2017 33.61 kg/m2 Never Smoker      Additional Information about your Body Mass Index (BMI)           Your BMI as listed above is considered obese (30 or more). BMI is an estimate of body fat, calculated from your height and weight. The higher your BMI, the greater your risk of heart disease, high blood pressure, type 2 diabetes, stroke, gallstones, arthritis, sleep apnea, and certain cancers. BMI is not perfect. It may overestimate body fat in athletes and people who are more muscular. Even a small weight loss (between 5 and 10 percent of your current weight) by decreasing your calorie intake and becoming more physically active will help lower your risk of developing or worsening diseases associated with obesity. Learn more at: tab ticketbroker.uk             Medications and Orders      Your Current Medications Are              Prenatal Multivit-Min-Fe-FA (PRENATAL VITAMINS) 0.8 MG TABS Take 1 tablet by mouth daily      Allergies           No Known Allergies         Additional Information        Basic Information     Date Of Birth Sex Race Ethnicity Preferred Language    1980 Female White Non-/Non  English      Problem List as of 10/4/2017  Date Reviewed: 10/4/2017                Abnormal first trimester screen, elevated free Beta-hCG level     11/10/14 - M, Apg 8/9, Wt ?     High Risk Pregnancy    Rh negative status during pregnancy    History of induced     History of miscarriage    Mild Pancreatitis (Lipase 106)    Hx of Trichomonas in current pregnancy

## 2017-10-04 NOTE — PROGRESS NOTES
REACTIVE NST  CATEGORY 1 TRACING  Moderate Variability  Baseline of 140 bpm  SEE SCANNED DOCUMENT  RTO 2-5 DAYS FOR A FOLLOW UP APPOINTMENT WITH  TESTING. Lucía Frank is a 40 y.o. female 33w6d        OB History    Para Term  AB Living   5 1 1 0 3 1   SAB TAB Ectopic Molar Multiple Live Births   2 1    1      # Outcome Date GA Lbr Joe/2nd Weight Sex Delivery Anes PTL Lv   5 Current            4 Term 11/10/14 40w0d  7 lb 2.6 oz (3.25 kg) M Vag-Spont EPI  JAEL      Birth Comments: 14 1829    Education information given to mother and she verbalizes understanding about the following:      Infant security. Patient safety. Skin to Skin Contact for You and Your Baby. Hour for family beginnings. Benefits of breastfeeding. 3 2005           2 2000           1 TAB 1998                  Vitals  BP: 114/78  Weight: 202 lb (91.6 kg)  Pulse: 97  Patient Position: Sitting  Albumin: Trace  Glucose: Negative      The patient was seen and evaluated. There was positive fetal movements. No contractions or leakage of fluid. Signs and symptoms of  labor as well as labor were reviewed. The S/S of Pre-Eclampsia were reviewed with the patient in detail. She is to report any of these if they occur. She currently denies any of these. The patient had her 28 week labs completed. 17 Pt given Tdap      T-Dap Vaccine (27-36 weeks): completed    The patient was instructed on fetal kick counts and was given a kick sheet to complete every 8 hours. She was instructed that the baby should move at a minimum of ten times within one hour after a meal. The patient was instructed to lay down on her left side twenty minutes after eating and count movements for up to one hour with a target value of ten movements. She was instructed to notify the office if she did not make that target after two attempts or if after any attempt there was less than four movements.     The patient placenta; therefore they have the same genes as the baby. This test is 98% accurate, but can carry a slightly higher risk of miscarriage than amniocentesis, since the procedure is done in early pregnancy. Amniocentesis is a procedure where a sample of fluid is removed from the amniotic sac for analysis and evaluation. During this procedure, fluid is removed by placing a long needle through the abdominal wall into the amniotic sac. The amniocentesis needle is typically guided into the sac with the help of ultrasound imaging. Once the needle is in the sac, a syringe is used to withdraw the clear mervat-colored amniotic fluid, which looks a bit like urine. NIPT, utilizes the maternal blood to test for fetal cells. These fetal cells are then karyotyped for genetic evaluation. All of these tests, CVS, NIPT and amniocentesis, let couples know if the fetus will have genetic abnormalities, and will help them make informed decisions regarding their pregnancy. Karyotyping by either CVS, NIPT or Amniocentesis is the ONLY way to confirm the genetic chromosomal structure regarding trisomy; Down's Syndrome, Edward's or Pateau's. [facility] was reviewed. If NIPT is positive then a confirmatory amniocentesis would be recommended to confirm the diagnosis. 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 HRP (high risk pregnancy), third trimester  [place] FETAL NON-STRESS TEST   2. Abnormal first trimester screen, elevated free Beta-hCG level  [place]   3. Polyhydramnios, antepartum complication, unspecified trimester, other fetus  [place] F[place] NON[place]STRESS TEST             Plan:  The patient will return to the office for her next visit in 1 weeks. See antepartum flow sheet.    Has MFM appointment 10/6/2017

## 2017-10-06 ENCOUNTER — ROUTINE PRENATAL (OUTPATIENT)
Dept: PERINATAL CARE | Age: 37
End: 2017-10-06
Payer: COMMERCIAL

## 2017-10-06 VITALS
BODY MASS INDEX: 33.49 KG/M2 | TEMPERATURE: 98.2 F | WEIGHT: 201 LBS | DIASTOLIC BLOOD PRESSURE: 83 MMHG | HEART RATE: 104 BPM | HEIGHT: 65 IN | RESPIRATION RATE: 16 BRPM | SYSTOLIC BLOOD PRESSURE: 116 MMHG

## 2017-10-06 DIAGNOSIS — O09.523 AMA (ADVANCED MATERNAL AGE) MULTIGRAVIDA 35+, THIRD TRIMESTER: ICD-10-CM

## 2017-10-06 DIAGNOSIS — O28.9 ABNORMAL FIRST TRIMESTER SCREEN: ICD-10-CM

## 2017-10-06 DIAGNOSIS — O35.8XX0 SUSPECTED DAMAGE TO FETUS FROM OTHER DISEASE IN MOTHER, AFFECTING MANAGEMENT OF MOTHER, ANTEPARTUM CONDITION OR COMPLICATION, NOT APPLICABLE OR UNSPECIFIED FETUS: ICD-10-CM

## 2017-10-06 DIAGNOSIS — Z3A.34 34 WEEKS GESTATION OF PREGNANCY: ICD-10-CM

## 2017-10-06 DIAGNOSIS — O28.9 ABNORMAL FIRST TRIMESTER SCREEN: Primary | ICD-10-CM

## 2017-10-06 DIAGNOSIS — O43.93 PLACENTAL DISORDER, THIRD TRIMESTER: ICD-10-CM

## 2017-10-06 PROBLEM — O43.90 PLACENTAL DISORDER: Status: ACTIVE | Noted: 2017-10-06

## 2017-10-06 PROCEDURE — 76819 FETAL BIOPHYS PROFIL W/O NST: CPT | Performed by: OBSTETRICS & GYNECOLOGY

## 2017-10-06 PROCEDURE — 76820 UMBILICAL ARTERY ECHO: CPT | Performed by: OBSTETRICS & GYNECOLOGY

## 2017-10-06 PROCEDURE — 76815 OB US LIMITED FETUS(S): CPT | Performed by: OBSTETRICS & GYNECOLOGY

## 2017-10-06 NOTE — MR AVS SNAPSHOT
After Visit Summary             Nicole Alegria   10/6/2017 10:00 AM   Routine Prenatal    Description:  Female : 1980   Provider:  Natacha Stark MD   Department:  [facility]              Your Follow-Up and Future Appointments         Below is a list of your follow-up and future appointments. This may not be a complete list as you may have made appointments directly with providers that we are not aware of or your providers may have made some for you. Please call your providers to confirm appointments. It is important to keep your appointments. Please bring your current insurance card, photo ID, co-pay, and all medication bottles to your appointment. If self-pay, payment is expected at the time of service. Your To-Do List     Future Appointments Provider Department Dept Phone    10/10/2017 10:00 AM Carlos Kulkarni DO; Rayvon Kussmaul, [place] [address] [address] VIA [facility] Obstetrics & Gynecology [phone]    Patients are asked to arrive 15 minutes prior to scheduled appointment time to complete paper work. 10/13/2017 9:00 AM NURSE SCHEDULE, P JAMES MATERNAL FETAL; ULTRASONOGRAPHER Elvia [facility] Maternal Fetal Med [phone]    10/17/2017 10:45 AM Darcus Gravel, Via [place] [facility] Obstetrics & Gynecology [phone]    Patients are asked to arrive 15 minutes prior to scheduled appointment time to complete paper work. 10/20/2017 9:15 AM NURSE SCHEDULE, [facility] JAMES MATERNAL FETAL; ULTRASONOGRAPHER Elvia 48 Maternal Fetal Med [phone]    10/24/2017 10:15 AM Darcus Gravel, Via [place] [facility] Obstetrics & Gynecology [phone]    Patients are asked to arrive 15 minutes prior to scheduled appointment time to complete paper work.     10/27/2017 9:00 AM NURSE SCHEDULE, [facility] MATERNAL FETAL; ULTRASONOGRAPHER Elvia [facility] Maternal Fetal Med [phone]    10/31/2017 10:45 AM Darcus Gravel, Via [place] [facility] [address] Obstetrics &

## 2017-10-11 ENCOUNTER — TELEPHONE (OUTPATIENT)
Dept: OBGYN CLINIC | Age: 37
End: 2017-10-11

## 2017-10-13 ENCOUNTER — ROUTINE PRENATAL (OUTPATIENT)
Dept: PERINATAL CARE | Age: 37
End: 2017-10-13
Payer: COMMERCIAL

## 2017-10-13 VITALS
DIASTOLIC BLOOD PRESSURE: 85 MMHG | HEART RATE: 105 BPM | SYSTOLIC BLOOD PRESSURE: 119 MMHG | BODY MASS INDEX: 33.82 KG/M2 | RESPIRATION RATE: 16 BRPM | WEIGHT: 203 LBS | HEIGHT: 65 IN | TEMPERATURE: 97.5 F

## 2017-10-13 DIAGNOSIS — O99.213 OBESITY COMPLICATING PREGNANCY, THIRD TRIMESTER: ICD-10-CM

## 2017-10-13 DIAGNOSIS — O09.523 AMA (ADVANCED MATERNAL AGE) MULTIGRAVIDA 35+, THIRD TRIMESTER: ICD-10-CM

## 2017-10-13 DIAGNOSIS — O28.9 ABNORMAL FIRST TRIMESTER SCREEN: Primary | ICD-10-CM

## 2017-10-13 DIAGNOSIS — O35.8XX0 SUSPECTED DAMAGE TO FETUS FROM OTHER DISEASE IN MOTHER, AFFECTING MANAGEMENT OF MOTHER, ANTEPARTUM CONDITION OR COMPLICATION, NOT APPLICABLE OR UNSPECIFIED FETUS: ICD-10-CM

## 2017-10-13 DIAGNOSIS — Z3A.35 35 WEEKS GESTATION OF PREGNANCY: ICD-10-CM

## 2017-10-13 DIAGNOSIS — Z36.4 ANTENATAL SCREENING FOR FETAL GROWTH RETARDATION USING ULTRASONICS: ICD-10-CM

## 2017-10-13 PROCEDURE — 76816 OB US FOLLOW-UP PER FETUS: CPT | Performed by: OBSTETRICS & GYNECOLOGY

## 2017-10-13 PROCEDURE — 76818 FETAL BIOPHYS PROFILE W/NST: CPT | Performed by: OBSTETRICS & GYNECOLOGY

## 2017-10-13 PROCEDURE — 76820 UMBILICAL ARTERY ECHO: CPT | Performed by: OBSTETRICS & GYNECOLOGY

## 2017-10-17 ENCOUNTER — ROUTINE PRENATAL (OUTPATIENT)
Dept: OBGYN CLINIC | Age: 37
End: 2017-10-17
Payer: COMMERCIAL

## 2017-10-17 VITALS
WEIGHT: 203 LBS | DIASTOLIC BLOOD PRESSURE: 70 MMHG | SYSTOLIC BLOOD PRESSURE: 114 MMHG | HEART RATE: 76 BPM | BODY MASS INDEX: 33.78 KG/M2

## 2017-10-17 DIAGNOSIS — Z67.91 RH NEGATIVE STATUS DURING PREGNANCY, UNSPECIFIED TRIMESTER: ICD-10-CM

## 2017-10-17 DIAGNOSIS — Z3A.35 35 WEEKS GESTATION OF PREGNANCY: ICD-10-CM

## 2017-10-17 DIAGNOSIS — O09.93 HIGH-RISK PREGNANCY IN THIRD TRIMESTER: Primary | ICD-10-CM

## 2017-10-17 DIAGNOSIS — R79.89 LOW THYROID STIMULATING HORMONE (TSH) LEVEL: ICD-10-CM

## 2017-10-17 DIAGNOSIS — O28.9 ABNORMAL FIRST TRIMESTER SCREEN: ICD-10-CM

## 2017-10-17 DIAGNOSIS — R87.612 LGSIL ON PAP SMEAR OF CERVIX: ICD-10-CM

## 2017-10-17 DIAGNOSIS — O09.523 AMA (ADVANCED MATERNAL AGE) MULTIGRAVIDA 35+, THIRD TRIMESTER: ICD-10-CM

## 2017-10-17 DIAGNOSIS — O26.899 RH NEGATIVE STATUS DURING PREGNANCY, UNSPECIFIED TRIMESTER: ICD-10-CM

## 2017-10-17 DIAGNOSIS — O40.9XX9: ICD-10-CM

## 2017-10-17 PROCEDURE — 59025 FETAL NON-STRESS TEST: CPT | Performed by: OBSTETRICS & GYNECOLOGY

## 2017-10-17 PROCEDURE — 0502F SUBSEQUENT PRENATAL CARE: CPT | Performed by: OBSTETRICS & GYNECOLOGY

## 2017-10-17 NOTE — PROGRESS NOTES
Kate Major is a 40 y.o. female 35w5d        OB History    Para Term  AB Living   5 1 1 0 3 1   SAB TAB Ectopic Molar Multiple Live Births   2 1       1      # Outcome Date GA Lbr Joe/2nd Weight Sex Delivery Anes PTL Lv   5 Current            4 Term 11/10/14 40w0d  7 lb 2.6 oz (3.25 kg) M Vag-Spont EPI  JAEL      Birth Comments: 14 1829    Education information given to mother and she verbalizes understanding about the following:      Infant security. Patient safety. Skin to Skin Contact for You and Your Baby. Hour for family beginnings. Benefits of breastfeeding. 3 SAB            2 2000           1 TAB                     Vitals  BP: 114/70  Weight: 203 lb (92.1 kg)  Pulse: 76  Patient Position: Sitting  Albumin: Negative  Glucose: Negative      The patient was seen and evaluated. There was positive fetal movements. No contractions or leakage of fluid. Signs and symptoms of labor were reviewed. The S/S of Pre-Eclampsia were reviewed with the patient in detail. She is to report any of these if they occur. She currently denies any of these. The patient was instructed on fetal kick counts and was given a kick sheet to complete every 8 hours. She was instructed that the baby should move at a minimum of ten times within one hour after a meal. The patient was instructed to lay down on her left side twenty minutes after eating and count movements for up to one hour with a target value of ten movements. She was instructed to notify the office if she did not make that target after two attempts or if after any attempt there was less than four movements. The patient reports that the targets have been made Yes.    17 Pt given Tdap      T-Dap Vaccine (27-36 weeks) Completed: Yes    Allergies: Allergies as of 10/17/2017    (No Known Allergies)       Group Beta Strep collection was completed.  No next visit  Sensitivities for clindamycin and erythromycin were ordered. No      The patient was counseled on the mandatory call ahead policy. She has been instructed to call the office at anytime prior to going into the hospital so the on-call physician may direct her to the appropriate facility for care. Exceptions were reviewed including but not limited to: Decreased fetal movement, vaginal Bleeding or hemorrhage, trauma, readily expectant delivery, or any instance where she feels 911 should be utilized. The patient was counseled on Labor & Delivery. Route of delivery and counseling on vaginal, operative vaginal, and  sections were completed with the risks of each to both the patient as well as her baby. The possibility of a blood transfusion was discussed as well. The patient was not opposed to receiving a transfusion if needed. The patient was counseled on types of analgesia during labor and is considering either Regional or IV medication the risks, benefits and alternatives were discussed. Assessment:  1. Royal Anguiano is a 40 y.o. female  2. [id number]  3. 35w5d      Patient Active Problem List    Diagnosis Date Noted    Abnormal first trimester screen, elevated free Beta-hCG level 2017     Priority: High     Overview Note:     Fetal echo scheduled.        11/10/14 - M, Apg 8/9, Wt ? 11/10/2014     Priority: High    High Risk Pregnancy 2014     Priority: High     Overview Note:     Rh-/RI/GBS neg      Rh negative status during pregnancy 2014     Priority: High     Overview Note:     [place] at 28 weeks      History of induced       Priority: Low    History of miscarriage      Priority: Low    Placental disorder 10/06/2017    Mild Pancreatitis (Lipase 106) 2017     Overview Note:     Mild Pancreatitis likely secondary to cholithiasis                        -RUQ shows no evidence concerning of obstruction                        -Recommended Low fat diet                        -CMP & Lipase levels to be repeated in 1 wk        Hx of Trichomonas in current pregnancy 2017     Overview Note:     Treated w/ 2g Flagyl on 17    JUAN DIEGO needed 17      Polyhydramnios, antepartum complication      Overview Note:     2017 polyhydramnios noted. Fetal echo completed- no obvious evidence of congenital heart defect. Lab slips given for Coxsackie virus A and B, CMV, Parvovirus B19, Toxoplasmosis, etc  Follow up in 4 weeks for fetal growth, BPP, umbilical artery dopplers  2017 JUDD WNL-follow up in 4 weeks with MFM for ultrasound      Suspected placental problem not found 2017    Suspected damage to fetus from other disease in mother, affecting management of mother, antepartum condition or complication     Abnormal first trimester screen: increased risk of fetal trisomy 21, 1:96 2017     Overview Note:     Fetal diploidy for chromosomes 21, 18, 13 confirmed through [facility]. Fetal echo scheduled.  AMA (advanced maternal age) multigravida 35+ 2017     Overview Note:     2017 declined invasive testing, opted for noninvasive prenatal testing through [address] [place] [address]. Advanced Maternal Age Counseling    If a woman is over the age of 28, she is considered to be of Advanced Maternal Age, and with this title comes risks for mom and baby. Increased risk of Down Syndrome - the most common chromosomal birth defect (chromosomes - cells that carry genes and transmit heredity information)   Increased risk of miscarriage   20% increase at ages 28 to 44   35% increase at ages 42-38   Over 50% increase by age 39  Increased risk of  Section () for delivery method   Gestational diabetes - diabetes that develops for the first time during pregnancy. Women who have this could possibly have a very large baby who then is at risk for injuries during delivery.    Pregnancy Induced Hypertension - High blood pressure   Placental Problems - one of the most common placental problems is placenta previa in which the placenta covers all or part of the uterine opening (cervix). This can cause severe bleeding during delivery and can make  delivery necessary. Even with all of these increased risks, with the advancement in medical procedures and testing, there are several ways to reduce your risk. They include early and regular prenatal care, taking the multivitamin with folic acid prescribed by your health care manager, beginning pregnancy at a healthy weight, not smoking or drinking alcoholic beverages, and eating healthy foods. There are tests that all pregnant women are encouraged to take, but there is additional prenatal testing that is regularly offered to any woman 28 or older because of the potential of increased risks to the mother and baby. These tests are chorionic villus sampling (CVS) and amniocentesis. Sumeet Silvamisa is also available which is a non-invasive option for fetal karyotyping. With CVS, a small sample of cells (called chorionic villi) is taken from the placenta where it attached to the wall of the uterus. Chorionic villi are tiny parts of the placenta; therefore they have the same genes as the baby. This test is 98% accurate, but can carry a slightly higher risk of miscarriage than amniocentesis, since the procedure is done in early pregnancy. Amniocentesis is a procedure where a sample of fluid is removed from the amniotic sac for analysis and evaluation. During this procedure, fluid is removed by placing a long needle through the abdominal wall into the amniotic sac. The amniocentesis needle is typically guided into the sac with the help of ultrasound imaging. Once the needle is in the sac, a syringe is used to withdraw the clear mervat-colored amniotic fluid, which looks a bit like urine. NIPT, utilizes the maternal blood to test for fetal cells. These fetal cells are then karyotyped for genetic evaluation.   All of these tests, CVS, NIPT and amniocentesis, let couples know if the fetus will have genetic abnormalities, and will help them make informed decisions regarding their pregnancy. Karyotyping by either CVS, NIPT or Amniocentesis is the ONLY way to confirm the genetic chromosomal structure regarding trisomy; Down's Syndrome, Edward's or Pateau's. [facility] was reviewed. If NIPT is positive then a confirmatory amniocentesis would be recommended to confirm the diagnosis. 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 High-risk pregnancy in third trimester  [place]   2. Abnormal [place] screen  [place]   3. Rh negative status during pregnancy, unspecified trimester     4. Polyhydramnios, antepartum complication, unspecified trimester, other fetus  [place]   5. AMA (advanced maternal age) multigravida 33+, third trimester     6. Low thyroid stimulating hormone (TSH) level     7. LGSIL on Pap smear of cervix     8. 35 weeks gestation of pregnancy  [place]             Plan:  The patient will return to the office for her next visit in 1 weeks. See antepartum flow sheet. REACTIVE NST  CATEGORY 1 TRACING  Moderate Variability  Baseline of 130 bpm  SEE SCANNED DOCUMENT  RTO 2-5 DAYS FOR A FOLLOW UP APPOINTMENT WITH  TESTING.

## 2017-10-24 ENCOUNTER — ROUTINE PRENATAL (OUTPATIENT)
Dept: OBGYN CLINIC | Age: 37
End: 2017-10-24
Payer: COMMERCIAL

## 2017-10-24 ENCOUNTER — HOSPITAL ENCOUNTER (OUTPATIENT)
Age: 37
Setting detail: SPECIMEN
Discharge: HOME | End: 2017-10-24
Payer: COMMERCIAL

## 2017-10-24 VITALS
BODY MASS INDEX: 33.95 KG/M2 | SYSTOLIC BLOOD PRESSURE: 116 MMHG | HEART RATE: 76 BPM | DIASTOLIC BLOOD PRESSURE: 72 MMHG | WEIGHT: 204 LBS

## 2017-10-24 DIAGNOSIS — O09.93 HIGH-RISK PREGNANCY IN THIRD TRIMESTER: Primary | ICD-10-CM

## 2017-10-24 DIAGNOSIS — O40.9XX0 POLYHYDRAMNIOS, ANTEPARTUM, SINGLE OR UNSPECIFIED FETUS: ICD-10-CM

## 2017-10-24 DIAGNOSIS — O28.9 ABNORMAL FIRST TRIMESTER SCREEN: ICD-10-CM

## 2017-10-24 DIAGNOSIS — O40.9XX0 POLYHYDRAMNIOS AFFECTING PREGNANCY: ICD-10-CM

## 2017-10-24 DIAGNOSIS — Z67.91 RH NEGATIVE, ANTEPARTUM: ICD-10-CM

## 2017-10-24 DIAGNOSIS — O26.899 RH NEGATIVE, ANTEPARTUM: ICD-10-CM

## 2017-10-24 DIAGNOSIS — O09.523 ELDERLY MULTIGRAVIDA IN THIRD TRIMESTER: ICD-10-CM

## 2017-10-24 DIAGNOSIS — R87.612 LGSIL ON PAP SMEAR OF CERVIX: ICD-10-CM

## 2017-10-24 DIAGNOSIS — Z3A.36 36 WEEKS GESTATION OF PREGNANCY: ICD-10-CM

## 2017-10-24 DIAGNOSIS — R79.89 LOW THYROID STIMULATING HORMONE (TSH) LEVEL: ICD-10-CM

## 2017-10-24 DIAGNOSIS — Z23 NEED FOR PROPHYLACTIC VACCINATION AND INOCULATION AGAINST INFLUENZA: ICD-10-CM

## 2017-10-24 PROCEDURE — 87081 CULTURE SCREEN ONLY: CPT

## 2017-10-24 PROCEDURE — G8417 CALC BMI ABV UP PARAM F/U: HCPCS | Performed by: OBSTETRICS & GYNECOLOGY

## 2017-10-24 PROCEDURE — G8427 DOCREV CUR MEDS BY ELIG CLIN: HCPCS | Performed by: OBSTETRICS & GYNECOLOGY

## 2017-10-24 PROCEDURE — 59025 FETAL NON-STRESS TEST: CPT | Performed by: OBSTETRICS & GYNECOLOGY

## 2017-10-24 PROCEDURE — 1036F TOBACCO NON-USER: CPT | Performed by: OBSTETRICS & GYNECOLOGY

## 2017-10-24 PROCEDURE — 0502F SUBSEQUENT PRENATAL CARE: CPT | Performed by: OBSTETRICS & GYNECOLOGY

## 2017-10-24 PROCEDURE — 90686 IIV4 VACC NO PRSV 0.5 ML IM: CPT | Performed by: OBSTETRICS & GYNECOLOGY

## 2017-10-24 PROCEDURE — G8484 FLU IMMUNIZE NO ADMIN: HCPCS | Performed by: OBSTETRICS & GYNECOLOGY

## 2017-10-24 PROCEDURE — 90471 IMMUNIZATION ADMIN: CPT | Performed by: OBSTETRICS & GYNECOLOGY

## 2017-10-24 NOTE — PROGRESS NOTES
 Hx of Trichomonas in current pregnancy 2017     Overview Note:     Treated w/ 2g Flagyl on 17    JUAN DIEGO needed 17      Polyhydramnios, antepartum complication      Overview Note:     2017 polyhydramnios noted. Fetal echo completed- no obvious evidence of congenital heart defect. Lab slips given for Coxsackie virus A and B, CMV, Parvovirus B19, Toxoplasmosis, etc  Follow up in 4 weeks for fetal growth, BPP, umbilical artery dopplers  2017 JUDD WNL-follow up in 4 weeks with MFM for ultrasound      Suspected placental problem not found 2017    Suspected damage to fetus from other disease in mother, affecting management of mother, antepartum condition or complication     Abnormal first trimester screen: increased risk of fetal trisomy 21, 1:96 2017     Overview Note:     Fetal diploidy for chromosomes 21, 18, 13 confirmed through [facility]. Fetal echo scheduled.  AMA (advanced maternal age) multigravida 35+ 2017     Overview Note:     2017 declined invasive testing, opted for noninvasive prenatal testing through [address] [place] [address]. Advanced Maternal Age Counseling    If a woman is over the age of 28, she is considered to be of Advanced Maternal Age, and with this title comes risks for mom and baby. Increased risk of Down Syndrome - the most common chromosomal birth defect (chromosomes - cells that carry genes and transmit heredity information)   Increased risk of miscarriage   20% increase at ages 28 to 44   35% increase at ages 42-38   Over 50% increase by age 39  Increased risk of  Section () for delivery method   Gestational diabetes - diabetes that develops for the first time during pregnancy. Women who have this could possibly have a very large baby who then is at risk for injuries during delivery.    Pregnancy Induced Hypertension - High blood pressure   Placental Problems - one of the most common placental problems is know if the fetus will have genetic abnormalities, and will help them make informed decisions regarding their pregnancy. Karyotyping by either CVS, NIPT or Amniocentesis is the ONLY way to confirm the genetic chromosomal structure regarding trisomy; Down's Syndrome, Edward's or Pateau's. [facility] was reviewed. If NIPT is positive then a confirmatory amniocentesis would be recommended to confirm the diagnosis. 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 High-risk pregnancy in third trimester  Hemoglobin A1C   2. Abnormal first trimester screen  [address]672 - [place] FETAL NON-STRESS TEST   3. Rh negative, antepartum     4. Polyhydramnios, antepartum, single or unspecified fetus  [address] - [place] F[place] NON-STRESS TEST    Hemoglobin A1C   5. Elderly multigravida in third trimester     6. Low thyroid stimulating hormone (TSH) level     7. LGSIL on Pap smear of cervix     8. 36 weeks gestation of pregnancy  Strep B Screen, Vaginal / Rectal   9. Polyhydramnios affecting pregnancy               Plan:  The patient will return to the office for her next visit in 1 weeks. See antepartum flow sheet. Pt noncompliant with 3 hour GTT. Reordered HgA1C. Pt noncompliant with getting it done. Importance of compliance stressed to pt. REACTIVE NST  CATEGORY 1 TRACING  Moderate Variability  Baseline of 130 bpm  SEE SCANNED DOCUMENT  RTO 2-5 DAYS FOR A FOLLOW UP APPOINTMENT WITH  TESTING. Pt follows up with MFM weekly for BPP/ dopplers. Pt noncompliant with appointments.  Pt has appointment with MFM this week

## 2017-10-27 ENCOUNTER — ROUTINE PRENATAL (OUTPATIENT)
Dept: PERINATAL CARE | Age: 37
End: 2017-10-27
Payer: COMMERCIAL

## 2017-10-27 VITALS
BODY MASS INDEX: 34.11 KG/M2 | RESPIRATION RATE: 16 BRPM | TEMPERATURE: 98.2 F | WEIGHT: 205 LBS | DIASTOLIC BLOOD PRESSURE: 72 MMHG | HEART RATE: 90 BPM | SYSTOLIC BLOOD PRESSURE: 104 MMHG

## 2017-10-27 DIAGNOSIS — O99.213 OBESITY AFFECTING PREGNANCY IN THIRD TRIMESTER: ICD-10-CM

## 2017-10-27 DIAGNOSIS — O35.8XX0 SUSPECTED DAMAGE TO FETUS FROM DISEASE IN MOTHER, ANTEPARTUM CONDITION, SINGLE OR UNSPECIFIED FETUS: ICD-10-CM

## 2017-10-27 DIAGNOSIS — O09.523 ELDERLY MULTIGRAVIDA IN THIRD TRIMESTER: ICD-10-CM

## 2017-10-27 DIAGNOSIS — O43.93 DISORDER OF PLACENTA IN THIRD TRIMESTER: ICD-10-CM

## 2017-10-27 DIAGNOSIS — O28.9 ABNORMAL FIRST TRIMESTER SCREEN: ICD-10-CM

## 2017-10-27 DIAGNOSIS — O28.9 ABNORMAL FIRST TRIMESTER SCREEN: Primary | ICD-10-CM

## 2017-10-27 LAB
CULTURE: NORMAL
CULTURE: NORMAL
Lab: NORMAL
SPECIMEN DESCRIPTION: NORMAL
SPECIMEN DESCRIPTION: NORMAL
STATUS: NORMAL

## 2017-10-27 PROCEDURE — 76818 FETAL BIOPHYS PROFILE W/NST: CPT | Performed by: OBSTETRICS & GYNECOLOGY

## 2017-10-27 PROCEDURE — 76820 UMBILICAL ARTERY ECHO: CPT | Performed by: OBSTETRICS & GYNECOLOGY

## 2017-10-27 PROCEDURE — 76815 OB US LIMITED FETUS(S): CPT | Performed by: OBSTETRICS & GYNECOLOGY

## 2017-10-31 ENCOUNTER — ROUTINE PRENATAL (OUTPATIENT)
Dept: OBGYN CLINIC | Age: 37
End: 2017-10-31
Payer: COMMERCIAL

## 2017-10-31 VITALS
SYSTOLIC BLOOD PRESSURE: 110 MMHG | WEIGHT: 204 LBS | BODY MASS INDEX: 33.95 KG/M2 | DIASTOLIC BLOOD PRESSURE: 82 MMHG | HEART RATE: 76 BPM

## 2017-10-31 DIAGNOSIS — O43.93 DISORDER OF PLACENTA IN THIRD TRIMESTER: ICD-10-CM

## 2017-10-31 DIAGNOSIS — A59.01 TRICHOMONAS VAGINITIS: ICD-10-CM

## 2017-10-31 DIAGNOSIS — O40.9XX0 POLYHYDRAMNIOS, ANTEPARTUM, SINGLE OR UNSPECIFIED FETUS: ICD-10-CM

## 2017-10-31 DIAGNOSIS — O28.9 ABNORMAL FIRST TRIMESTER SCREEN: ICD-10-CM

## 2017-10-31 DIAGNOSIS — O09.93 HRP (HIGH RISK PREGNANCY), THIRD TRIMESTER: Primary | ICD-10-CM

## 2017-10-31 DIAGNOSIS — Z3A.37 37 WEEKS GESTATION OF PREGNANCY: ICD-10-CM

## 2017-10-31 DIAGNOSIS — Z67.91 RH NEGATIVE STATUS DURING PREGNANCY IN THIRD TRIMESTER: ICD-10-CM

## 2017-10-31 DIAGNOSIS — R87.612 LGSIL ON PAP SMEAR OF CERVIX: ICD-10-CM

## 2017-10-31 DIAGNOSIS — O09.523 ELDERLY MULTIGRAVIDA IN THIRD TRIMESTER: ICD-10-CM

## 2017-10-31 DIAGNOSIS — Z87.59 HISTORY OF MISCARRIAGE: ICD-10-CM

## 2017-10-31 DIAGNOSIS — O35.8XX0 SUSPECTED DAMAGE TO FETUS FROM DISEASE IN MOTHER, ANTEPARTUM CONDITION, SINGLE OR UNSPECIFIED FETUS: ICD-10-CM

## 2017-10-31 DIAGNOSIS — O26.893 RH NEGATIVE STATUS DURING PREGNANCY IN THIRD TRIMESTER: ICD-10-CM

## 2017-10-31 DIAGNOSIS — O09.93 HIGH-RISK PREGNANCY IN THIRD TRIMESTER: ICD-10-CM

## 2017-10-31 DIAGNOSIS — R79.89 LOW THYROID STIMULATING HORMONE (TSH) LEVEL: ICD-10-CM

## 2017-10-31 PROCEDURE — 59025 FETAL NON-STRESS TEST: CPT | Performed by: OBSTETRICS & GYNECOLOGY

## 2017-10-31 PROCEDURE — G8417 CALC BMI ABV UP PARAM F/U: HCPCS | Performed by: OBSTETRICS & GYNECOLOGY

## 2017-10-31 PROCEDURE — 0502F SUBSEQUENT PRENATAL CARE: CPT | Performed by: OBSTETRICS & GYNECOLOGY

## 2017-10-31 PROCEDURE — G8484 FLU IMMUNIZE NO ADMIN: HCPCS | Performed by: OBSTETRICS & GYNECOLOGY

## 2017-10-31 PROCEDURE — G8427 DOCREV CUR MEDS BY ELIG CLIN: HCPCS | Performed by: OBSTETRICS & GYNECOLOGY

## 2017-10-31 PROCEDURE — 1036F TOBACCO NON-USER: CPT | Performed by: OBSTETRICS & GYNECOLOGY

## 2017-10-31 NOTE — PROGRESS NOTES
Shana Burgos is a 40 y.o. female 37w6d        OB History    Para Term  AB Living   5 1 1 0 3 1   SAB TAB Ectopic Molar Multiple Live Births   2 1       1      # Outcome Date GA Lbr Joe/2nd Weight Sex Delivery Anes PTL Lv   5 Current            4 Term 11/10/14 40w0d  7 lb 2.6 oz (3.25 kg) M Vag-Spont EPI  JAEL      Birth Comments: 14 1829    Education information given to mother and she verbalizes understanding about the following:      Infant security. Patient safety. Skin to Skin Contact for You and Your Baby. Hour for family beginnings. Benefits of breastfeeding. 3 SAB            2 2000           1 TAB 1998                  Vitals  BP: 110/82  Weight: 204 lb (92.5 kg)  Pulse: 76  Patient Position: Sitting  Albumin: 1+  Glucose: Negative      The patient was seen and evaluated. There was positive fetal movements. No contractions or leakage of fluid. Signs and symptoms of labor were reviewed. The S/S of Pre-Eclampsia were reviewed with the patient in detail. She is to report any of these if they occur. She currently denies any of these. The patient was instructed on fetal kick counts and was given a kick sheet to complete every 8 hours. She was instructed that the baby should move at a minimum of ten times within one hour after a meal. The patient was instructed to lay down on her left side twenty minutes after eating and count movements for up to one hour with a target value of ten movements. She was instructed to notify the office if she did not make that target after two attempts or if after any attempt there was less than four movements. The patient reports that the targets have been made Yes.    17 Pt given Tdap      T-Dap Vaccine Completed (27-36 weeks): Yes    Allergies: Allergies as of 10/31/2017    (No Known Allergies)         Group Beta Strep collection was completed.  Yes    The patient was found to be GBS: negative    Cervical Exam was: Apg 8/9, Wt ? 11/10/2014     Priority: High    High Risk Pregnancy 2014     Priority: High     Overview Note:     Rh-/RI/GBS neg      Rh negative status during pregnancy 2014     Priority: High     Overview Note:     [place] at 28 weeks      History of induced       Priority: Low    History of miscarriage      Priority: Low    Placental disorder 10/06/2017    Mild Pancreatitis (Lipase 106) 2017     Overview Note:     Mild Pancreatitis likely secondary to cholithiasis                        -RUQ shows no evidence concerning of obstruction                        -Recommended Low fat diet                        -CMP & Lipase levels to be repeated in 1 wk        Hx of Trichomonas in current pregnancy 2017     Overview Note:     Treated w/ 2g Flagyl on 17    JUAN DIEGO needed 17      Polyhydramnios, antepartum complication      Overview Note:     2017 polyhydramnios noted. Fetal echo completed- no obvious evidence of congenital heart defect. Lab slips given for Coxsackie virus A and B, CMV, Parvovirus B19, Toxoplasmosis, etc  Follow up in 4 weeks for fetal growth, BPP, umbilical artery dopplers  2017 JUDD WNL-follow up in 4 weeks with M for ultrasound      Suspected placental problem not found 2017    Suspected damage to fetus from other disease in mother, affecting management of mother, antepartum condition or complication     Abnormal first trimester screen: increased risk of fetal trisomy 21, 1:96 2017     Overview Note:     Fetal diploidy for chromosomes 21, 18, 13 confirmed through [facility]. Fetal echo scheduled.  AMA (advanced maternal age) multigravida 35+ 2017     Overview Note:     2017 declined invasive testing, opted for noninvasive prenatal testing through [address] [place] [address].     Advanced Maternal Age Counseling    If a woman is over the age of 28, she is considered to be of Advanced Maternal Age, and with this title comes risks for mom and baby. Increased risk of Down Syndrome - the most common chromosomal birth defect (chromosomes - cells that carry genes and transmit heredity information)   Increased risk of miscarriage   20% increase at ages 28 to 44   35% increase at ages 42-38   Over 50% increase by age 39  Increased risk of  Section () for delivery method   Gestational diabetes - diabetes that develops for the first time during pregnancy. Women who have this could possibly have a very large baby who then is at risk for injuries during delivery. Pregnancy Induced Hypertension - High blood pressure   Placental Problems - one of the most common placental problems is placenta previa in which the placenta covers all or part of the uterine opening (cervix). This can cause severe bleeding during delivery and can make  delivery necessary. Even with all of these increased risks, with the advancement in medical procedures and testing, there are several ways to reduce your risk. They include early and regular prenatal care, taking the multivitamin with folic acid prescribed by your health care manager, beginning pregnancy at a healthy weight, not smoking or drinking alcoholic beverages, and eating healthy foods. There are tests that all pregnant women are encouraged to take, but there is additional prenatal testing that is regularly offered to any woman 28 or older because of the potential of increased risks to the mother and baby. These tests are chorionic villus sampling (CVS) and amniocentesis. Allen Davenport is also available which is a non-invasive option for fetal karyotyping. With CVS, a small sample of cells (called chorionic villi) is taken from the placenta where it attached to the wall of the uterus. Chorionic villi are tiny parts of the placenta; therefore they have the same genes as the baby.  This test is 98% accurate, but can carry a slightly higher risk of miscarriage than amniocentesis, since the procedure is done in early pregnancy. Amniocentesis is a procedure where a sample of fluid is removed from the amniotic sac for analysis and evaluation. During this procedure, fluid is removed by placing a long needle through the abdominal wall into the amniotic sac. The amniocentesis needle is typically guided into the sac with the help of ultrasound imaging. Once the needle is in the sac, a syringe is used to withdraw the clear mervat-colored amniotic fluid, which looks a bit like urine. NIPT, utilizes the maternal blood to test for fetal cells. These fetal cells are then karyotyped for genetic evaluation. All of these tests, CVS, NIPT and amniocentesis, let couples know if the fetus will have genetic abnormalities, and will help them make informed decisions regarding their pregnancy. Karyotyping by either CVS, NIPT or Amniocentesis is the ONLY way to confirm the genetic chromosomal structure regarding trisomy; Down's Syndrome, Edward's or Pateau's. [facility] was reviewed. If NIPT is positive then a confirmatory amniocentesis would be recommended to confirm the diagnosis. 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 HRP (high risk pregnancy), third trimester     2. 37 weeks gestation of pregnancy  46800 - DE FETAL NON-STRESS TEST   3. Hx of Trichomonas in current pregnancy     4. High-risk pregnancy in third trimester     5.  11/10/14 - M, Apg 8/9, Wt ?     6. Abnormal first trimester screen, elevated free Beta-hCG level  84526 - DE FETAL NON-STRESS TEST   7. History of miscarriage     8. Elderly multigravida in third trimester     9. Polyhydramnios, antepartum, single or unspecified fetus  07664 - DE FETAL NON-STRESS TEST   10.  Suspected damage to fetus from disease in mother, antepartum

## 2017-11-03 ENCOUNTER — ROUTINE PRENATAL (OUTPATIENT)
Dept: PERINATAL CARE | Age: 37
End: 2017-11-03
Payer: COMMERCIAL

## 2017-11-03 VITALS
DIASTOLIC BLOOD PRESSURE: 86 MMHG | TEMPERATURE: 97.8 F | BODY MASS INDEX: 34.24 KG/M2 | WEIGHT: 205.5 LBS | HEIGHT: 65 IN | HEART RATE: 99 BPM | RESPIRATION RATE: 2 BRPM | SYSTOLIC BLOOD PRESSURE: 110 MMHG

## 2017-11-03 DIAGNOSIS — Z13.89 ENCOUNTER FOR ROUTINE SCREENING FOR MALFORMATION USING ULTRASONICS: ICD-10-CM

## 2017-11-03 DIAGNOSIS — Z3A.38 38 WEEKS GESTATION OF PREGNANCY: ICD-10-CM

## 2017-11-03 DIAGNOSIS — O35.8XX0 SUSPECTED DAMAGE TO FETUS FROM DISEASE IN MOTHER, ANTEPARTUM CONDITION, SINGLE OR UNSPECIFIED FETUS: ICD-10-CM

## 2017-11-03 DIAGNOSIS — O28.9 ABNORMAL FIRST TRIMESTER SCREEN: Primary | ICD-10-CM

## 2017-11-03 DIAGNOSIS — O28.9 ABNORMAL FIRST TRIMESTER SCREEN: ICD-10-CM

## 2017-11-03 DIAGNOSIS — Z36.4 ANTENATAL SCREENING FOR FETAL GROWTH RETARDATION USING ULTRASONICS: ICD-10-CM

## 2017-11-03 DIAGNOSIS — O09.523 ELDERLY MULTIGRAVIDA IN THIRD TRIMESTER: ICD-10-CM

## 2017-11-03 PROCEDURE — 76820 UMBILICAL ARTERY ECHO: CPT | Performed by: OBSTETRICS & GYNECOLOGY

## 2017-11-03 PROCEDURE — 76805 OB US >/= 14 WKS SNGL FETUS: CPT | Performed by: OBSTETRICS & GYNECOLOGY

## 2017-11-03 PROCEDURE — 76818 FETAL BIOPHYS PROFILE W/NST: CPT | Performed by: OBSTETRICS & GYNECOLOGY

## 2017-11-06 ENCOUNTER — ANESTHESIA (OUTPATIENT)
Dept: LABOR AND DELIVERY | Age: 37
End: 2017-11-06
Payer: COMMERCIAL

## 2017-11-06 ENCOUNTER — ANESTHESIA EVENT (OUTPATIENT)
Dept: LABOR AND DELIVERY | Age: 37
End: 2017-11-06
Payer: COMMERCIAL

## 2017-11-06 ENCOUNTER — HOSPITAL ENCOUNTER (INPATIENT)
Age: 37
LOS: 1 days | Discharge: HOME | DRG: 776 | End: 2017-11-07
Attending: OBSTETRICS & GYNECOLOGY | Admitting: OBSTETRICS & GYNECOLOGY
Payer: COMMERCIAL

## 2017-11-06 ENCOUNTER — HOSPITAL ENCOUNTER (INPATIENT)
Age: 37
LOS: 1 days | Discharge: ANOTHER HOSPITAL | End: 2017-11-06
Attending: OBSTETRICS & GYNECOLOGY | Admitting: OBSTETRICS & GYNECOLOGY
Payer: COMMERCIAL

## 2017-11-06 VITALS
HEART RATE: 88 BPM | RESPIRATION RATE: 16 BRPM | TEMPERATURE: 98.1 F | WEIGHT: 200 LBS | SYSTOLIC BLOOD PRESSURE: 128 MMHG | HEIGHT: 65 IN | OXYGEN SATURATION: 100 % | DIASTOLIC BLOOD PRESSURE: 72 MMHG | BODY MASS INDEX: 33.32 KG/M2

## 2017-11-06 PROBLEM — O43.90 PLACENTAL DISORDER: Status: RESOLVED | Noted: 2017-10-06 | Resolved: 2017-11-06

## 2017-11-06 PROBLEM — R79.89 LOW TSH LEVEL: Status: ACTIVE | Noted: 2017-11-06

## 2017-11-06 PROBLEM — O35.8XX0 SUSPECTED DAMAGE TO FETUS FROM OTHER DISEASE IN MOTHER, AFFECTING MANAGEMENT OF MOTHER, ANTEPARTUM CONDITION OR COMPLICATION: Status: RESOLVED | Noted: 2017-07-27 | Resolved: 2017-11-06

## 2017-11-06 PROBLEM — R79.89 LOW THYROID STIMULATING HORMONE (TSH) LEVEL: Status: RESOLVED | Noted: 2017-04-24 | Resolved: 2017-11-06

## 2017-11-06 PROBLEM — Z03.72 SUSPECTED PLACENTAL PROBLEM NOT FOUND: Status: RESOLVED | Noted: 2017-07-27 | Resolved: 2017-11-06

## 2017-11-06 LAB
ABO/RH: NORMAL
ABSOLUTE EOS #: 0 K/UL (ref 0–0.4)
ABSOLUTE IMMATURE GRANULOCYTE: ABNORMAL K/UL (ref 0–0.3)
ABSOLUTE LYMPH #: 1.6 K/UL (ref 1–4.8)
ABSOLUTE MONO #: 0.6 K/UL (ref 0.1–1.3)
AMPHETAMINE SCREEN URINE: NEGATIVE
ANTIBODY SCREEN: NEGATIVE
ARM BAND NUMBER: NORMAL
BARBITURATE SCREEN URINE: NEGATIVE
BASOPHILS # BLD: 0 %
BASOPHILS ABSOLUTE: 0 K/UL (ref 0–0.2)
BENZODIAZEPINE SCREEN, URINE: NEGATIVE
BUPRENORPHINE URINE: NORMAL
CANNABINOID SCREEN URINE: NEGATIVE
CARBOXYHEMOGLOBIN: 1.2 %
CARBOXYHEMOGLOBIN: ABNORMAL %
COCAINE METABOLITE, URINE: NEGATIVE
DIFFERENTIAL TYPE: ABNORMAL
DIRECT EXAM: ABNORMAL
EOSINOPHILS RELATIVE PERCENT: 0 %
EXPIRATION DATE: NORMAL
GLUCOSE BLD-MCNC: 108 MG/DL (ref 65–105)
HCO3 CORD ARTERIAL: ABNORMAL MMOL/L
HCO3 CORD VENOUS: 24 MMOL/L
HCT VFR BLD CALC: 39.2 % (ref 36–46)
HEMOGLOBIN: 13.3 G/DL (ref 12–16)
IMMATURE GRANULOCYTES: ABNORMAL %
LYMPHOCYTES # BLD: 17 %
Lab: ABNORMAL
MCH RBC QN AUTO: 30.7 PG (ref 26–34)
MCHC RBC AUTO-ENTMCNC: 33.9 G/DL (ref 31–37)
MCV RBC AUTO: 90.5 FL (ref 80–100)
MDMA URINE: NORMAL
METHADONE SCREEN, URINE: NEGATIVE
METHAMPHETAMINE, URINE: NORMAL
METHEMOGLOBIN: 1.1 % (ref 0–1.9)
METHEMOGLOBIN: ABNORMAL % (ref 0–1.9)
MONOCYTES # BLD: 7 %
NEGATIVE BASE EXCESS, CORD, ART: ABNORMAL MMOL/L
NEGATIVE BASE EXCESS, CORD, VEN: 1.2 MMOL/L
O2 SAT CORD ARTERIAL: ABNORMAL %
O2 SAT CORD VENOUS: 57.3 %
OPIATES, URINE: NEGATIVE
OXYCODONE SCREEN URINE: NEGATIVE
PCO2 CORD ARTERIAL: ABNORMAL MMHG (ref 33–49)
PCO2 CORD VENOUS: 41 MMHG (ref 28–40)
PDW BLD-RTO: 15 % (ref 11.5–14.9)
PH CORD ARTERIAL: ABNORMAL (ref 7.21–7.31)
PH CORD VENOUS: 7.38 (ref 7.31–7.37)
PHENCYCLIDINE, URINE: NEGATIVE
PLATELET # BLD: 212 K/UL (ref 150–450)
PLATELET ESTIMATE: ABNORMAL
PMV BLD AUTO: 7.8 FL (ref 6–12)
PO2 CORD ARTERIAL: ABNORMAL MMHG (ref 9–19)
PO2 CORD VENOUS: 22.8 MMHG (ref 21–31)
POSITIVE BASE EXCESS, CORD, ART: ABNORMAL MMOL/L
POSITIVE BASE EXCESS, CORD, VEN: ABNORMAL MMOL/L
PROPOXYPHENE, URINE: NORMAL
RBC # BLD: 4.33 M/UL (ref 4–5.2)
RBC # BLD: ABNORMAL 10*6/UL
SEG NEUTROPHILS: 76 %
SEGMENTED NEUTROPHILS ABSOLUTE COUNT: 7.4 K/UL (ref 1.3–9.1)
SPECIMEN DESCRIPTION: ABNORMAL
SPECIMEN DESCRIPTION: ABNORMAL
STATUS: ABNORMAL
T. PALLIDUM, IGG: NONREACTIVE
TEST INFORMATION: NORMAL
TEXT FOR RESPIRATORY: ABNORMAL
TRICYCLIC ANTIDEPRESSANTS, UR: NORMAL
TSH SERPL DL<=0.05 MIU/L-ACNC: 0.73 MIU/L (ref 0.3–5)
WBC # BLD: 9.7 K/UL (ref 3.5–11)
WBC # BLD: ABNORMAL 10*3/UL

## 2017-11-06 PROCEDURE — 86850 RBC ANTIBODY SCREEN: CPT

## 2017-11-06 PROCEDURE — 59400 OBSTETRICAL CARE: CPT | Performed by: OBSTETRICS & GYNECOLOGY

## 2017-11-06 PROCEDURE — 80307 DRUG TEST PRSMV CHEM ANLYZR: CPT

## 2017-11-06 PROCEDURE — 84443 ASSAY THYROID STIM HORMONE: CPT

## 2017-11-06 PROCEDURE — 6360000002 HC RX W HCPCS

## 2017-11-06 PROCEDURE — 1220000000 HC SEMI PRIVATE OB R&B

## 2017-11-06 PROCEDURE — 86780 TREPONEMA PALLIDUM: CPT

## 2017-11-06 PROCEDURE — 87510 GARDNER VAG DNA DIR PROBE: CPT

## 2017-11-06 PROCEDURE — 88307 TISSUE EXAM BY PATHOLOGIST: CPT

## 2017-11-06 PROCEDURE — 86901 BLOOD TYPING SEROLOGIC RH(D): CPT

## 2017-11-06 PROCEDURE — 0HQ9XZZ REPAIR PERINEUM SKIN, EXTERNAL APPROACH: ICD-10-PCS | Performed by: OBSTETRICS & GYNECOLOGY

## 2017-11-06 PROCEDURE — 85025 COMPLETE CBC W/AUTO DIFF WBC: CPT

## 2017-11-06 PROCEDURE — 86900 BLOOD TYPING SEROLOGIC ABO: CPT

## 2017-11-06 PROCEDURE — 87480 CANDIDA DNA DIR PROBE: CPT

## 2017-11-06 PROCEDURE — 82805 BLOOD GASES W/O2 SATURATION: CPT

## 2017-11-06 PROCEDURE — 6370000000 HC RX 637 (ALT 250 FOR IP): Performed by: STUDENT IN AN ORGANIZED HEALTH CARE EDUCATION/TRAINING PROGRAM

## 2017-11-06 PROCEDURE — 6360000002 HC RX W HCPCS: Performed by: ANESTHESIOLOGY

## 2017-11-06 PROCEDURE — 87660 TRICHOMONAS VAGIN DIR PROBE: CPT

## 2017-11-06 PROCEDURE — 96361 HYDRATE IV INFUSION ADD-ON: CPT

## 2017-11-06 PROCEDURE — 6360000002 HC RX W HCPCS: Performed by: STUDENT IN AN ORGANIZED HEALTH CARE EDUCATION/TRAINING PROGRAM

## 2017-11-06 PROCEDURE — 96372 THER/PROPH/DIAG INJ SC/IM: CPT

## 2017-11-06 PROCEDURE — 7200000001 HC VAGINAL DELIVERY

## 2017-11-06 PROCEDURE — 85461 HEMOGLOBIN FETAL: CPT

## 2017-11-06 PROCEDURE — 94762 N-INVAS EAR/PLS OXIMTRY CONT: CPT

## 2017-11-06 PROCEDURE — 2500000003 HC RX 250 WO HCPCS

## 2017-11-06 PROCEDURE — 82800 BLOOD PH: CPT

## 2017-11-06 PROCEDURE — 36415 COLL VENOUS BLD VENIPUNCTURE: CPT

## 2017-11-06 PROCEDURE — 96360 HYDRATION IV INFUSION INIT: CPT

## 2017-11-06 PROCEDURE — 82947 ASSAY GLUCOSE BLOOD QUANT: CPT

## 2017-11-06 PROCEDURE — 2580000003 HC RX 258: Performed by: STUDENT IN AN ORGANIZED HEALTH CARE EDUCATION/TRAINING PROGRAM

## 2017-11-06 RX ORDER — DOCUSATE SODIUM 100 MG/1
100 CAPSULE, LIQUID FILLED ORAL 2 TIMES DAILY
Status: DISCONTINUED | OUTPATIENT
Start: 2017-11-06 | End: 2017-11-07 | Stop reason: HOSPADM

## 2017-11-06 RX ORDER — SIMETHICONE 80 MG
80 TABLET,CHEWABLE ORAL EVERY 6 HOURS PRN
Status: DISCONTINUED | OUTPATIENT
Start: 2017-11-06 | End: 2017-11-07 | Stop reason: HOSPADM

## 2017-11-06 RX ORDER — ONDANSETRON 4 MG/1
8 TABLET, FILM COATED ORAL EVERY 8 HOURS PRN
Status: DISCONTINUED | OUTPATIENT
Start: 2017-11-06 | End: 2017-11-07 | Stop reason: HOSPADM

## 2017-11-06 RX ORDER — PSEUDOEPHEDRINE HCL 30 MG
100 TABLET ORAL 2 TIMES DAILY
Qty: 60 CAPSULE | Refills: 0 | Status: ON HOLD | OUTPATIENT
Start: 2017-11-06 | End: 2017-11-07 | Stop reason: HOSPADM

## 2017-11-06 RX ORDER — ONDANSETRON 2 MG/ML
4 INJECTION INTRAMUSCULAR; INTRAVENOUS EVERY 6 HOURS PRN
Status: DISCONTINUED | OUTPATIENT
Start: 2017-11-06 | End: 2017-11-06

## 2017-11-06 RX ORDER — DIPHENHYDRAMINE HCL 25 MG
25 TABLET ORAL EVERY 4 HOURS PRN
Status: DISCONTINUED | OUTPATIENT
Start: 2017-11-06 | End: 2017-11-06

## 2017-11-06 RX ORDER — NAPROXEN 500 MG/1
500 TABLET ORAL 2 TIMES DAILY
Qty: 30 TABLET | Refills: 0 | Status: ON HOLD | OUTPATIENT
Start: 2017-11-06 | End: 2017-11-07 | Stop reason: HOSPADM

## 2017-11-06 RX ORDER — ACETAMINOPHEN 500 MG
1000 TABLET ORAL EVERY 6 HOURS PRN
Status: DISCONTINUED | OUTPATIENT
Start: 2017-11-06 | End: 2017-11-07 | Stop reason: HOSPADM

## 2017-11-06 RX ORDER — SODIUM CHLORIDE, SODIUM LACTATE, POTASSIUM CHLORIDE, CALCIUM CHLORIDE 600; 310; 30; 20 MG/100ML; MG/100ML; MG/100ML; MG/100ML
INJECTION, SOLUTION INTRAVENOUS CONTINUOUS
Status: DISCONTINUED | OUTPATIENT
Start: 2017-11-06 | End: 2017-11-06

## 2017-11-06 RX ORDER — NAPROXEN 250 MG/1
500 TABLET ORAL 2 TIMES DAILY
Status: DISCONTINUED | OUTPATIENT
Start: 2017-11-06 | End: 2017-11-07

## 2017-11-06 RX ORDER — ROPIVACAINE HYDROCHLORIDE 2 MG/ML
INJECTION, SOLUTION EPIDURAL; INFILTRATION; PERINEURAL CONTINUOUS PRN
Status: DISCONTINUED | OUTPATIENT
Start: 2017-11-06 | End: 2018-01-10 | Stop reason: SDUPTHER

## 2017-11-06 RX ORDER — FENTANYL CITRATE 50 UG/ML
INJECTION, SOLUTION INTRAMUSCULAR; INTRAVENOUS PRN
Status: DISCONTINUED | OUTPATIENT
Start: 2017-11-06 | End: 2018-01-10 | Stop reason: SDUPTHER

## 2017-11-06 RX ORDER — DOCUSATE SODIUM 100 MG/1
100 CAPSULE, LIQUID FILLED ORAL 2 TIMES DAILY
Status: DISCONTINUED | OUTPATIENT
Start: 2017-11-06 | End: 2017-11-06 | Stop reason: HOSPADM

## 2017-11-06 RX ORDER — EPHEDRINE SULFATE 50 MG/ML
INJECTION, SOLUTION INTRAVENOUS
Status: COMPLETED
Start: 2017-11-06 | End: 2017-11-06

## 2017-11-06 RX ORDER — ACETAMINOPHEN 325 MG/1
650 TABLET ORAL EVERY 4 HOURS PRN
Status: DISCONTINUED | OUTPATIENT
Start: 2017-11-06 | End: 2017-11-06

## 2017-11-06 RX ORDER — SIMETHICONE 80 MG
80 TABLET,CHEWABLE ORAL EVERY 6 HOURS PRN
Status: DISCONTINUED | OUTPATIENT
Start: 2017-11-06 | End: 2017-11-06 | Stop reason: HOSPADM

## 2017-11-06 RX ORDER — ONDANSETRON HYDROCHLORIDE 8 MG/1
8 TABLET, FILM COATED ORAL EVERY 8 HOURS PRN
Status: DISCONTINUED | OUTPATIENT
Start: 2017-11-06 | End: 2017-11-06 | Stop reason: HOSPADM

## 2017-11-06 RX ORDER — NALOXONE HYDROCHLORIDE 0.4 MG/ML
0.4 INJECTION, SOLUTION INTRAMUSCULAR; INTRAVENOUS; SUBCUTANEOUS PRN
Status: DISCONTINUED | OUTPATIENT
Start: 2017-11-06 | End: 2017-11-06

## 2017-11-06 RX ORDER — FENTANYL CITRATE 50 UG/ML
INJECTION, SOLUTION INTRAMUSCULAR; INTRAVENOUS
Status: COMPLETED
Start: 2017-11-06 | End: 2017-11-06

## 2017-11-06 RX ORDER — LANOLIN 100 %
OINTMENT (GRAM) TOPICAL PRN
Status: DISCONTINUED | OUTPATIENT
Start: 2017-11-06 | End: 2017-11-06 | Stop reason: HOSPADM

## 2017-11-06 RX ORDER — LANOLIN 100 %
OINTMENT (GRAM) TOPICAL PRN
Status: DISCONTINUED | OUTPATIENT
Start: 2017-11-06 | End: 2017-11-07 | Stop reason: HOSPADM

## 2017-11-06 RX ORDER — NAPROXEN 500 MG/1
500 TABLET ORAL 2 TIMES DAILY
Status: DISCONTINUED | OUTPATIENT
Start: 2017-11-06 | End: 2017-11-06 | Stop reason: HOSPADM

## 2017-11-06 RX ORDER — ACETAMINOPHEN 500 MG
1000 TABLET ORAL EVERY 6 HOURS PRN
Status: DISCONTINUED | OUTPATIENT
Start: 2017-11-06 | End: 2017-11-06 | Stop reason: HOSPADM

## 2017-11-06 RX ORDER — METRONIDAZOLE 500 MG/1
2000 TABLET ORAL ONCE
Status: COMPLETED | OUTPATIENT
Start: 2017-11-06 | End: 2017-11-06

## 2017-11-06 RX ADMIN — SODIUM CHLORIDE, POTASSIUM CHLORIDE, SODIUM LACTATE AND CALCIUM CHLORIDE: 600; 310; 30; 20 INJECTION, SOLUTION INTRAVENOUS at 04:36

## 2017-11-06 RX ADMIN — NAPROXEN 500 MG: 500 TABLET, DELAYED RELEASE ORAL at 12:01

## 2017-11-06 RX ADMIN — DOCUSATE SODIUM 100 MG: 100 TABLET, FILM COATED ORAL at 21:51

## 2017-11-06 RX ADMIN — Medication 1 MILLI-UNITS/MIN: at 08:03

## 2017-11-06 RX ADMIN — NAPROXEN 500 MG: 250 TABLET ORAL at 21:51

## 2017-11-06 RX ADMIN — HUMAN RHO(D) IMMUNE GLOBULIN 300 MCG: 300 INJECTION, SOLUTION INTRAMUSCULAR at 13:58

## 2017-11-06 RX ADMIN — METRONIDAZOLE 2000 MG: 500 TABLET ORAL at 13:18

## 2017-11-06 RX ADMIN — SODIUM CHLORIDE, POTASSIUM CHLORIDE, SODIUM LACTATE AND CALCIUM CHLORIDE: 600; 310; 30; 20 INJECTION, SOLUTION INTRAVENOUS at 03:25

## 2017-11-06 RX ADMIN — FENTANYL CITRATE 22 MCG: 50 INJECTION, SOLUTION INTRAMUSCULAR; INTRAVENOUS at 05:20

## 2017-11-06 RX ADMIN — FENTANYL CITRATE 25 MCG/ML: 50 INJECTION INTRAMUSCULAR; INTRAVENOUS at 05:30

## 2017-11-06 RX ADMIN — ROPIVACAINE HYDROCHLORIDE 8 ML/HR: 2 INJECTION, SOLUTION EPIDURAL; INFILTRATION at 05:21

## 2017-11-06 RX ADMIN — Medication 8 ML/HR: at 05:26

## 2017-11-06 RX ADMIN — SODIUM CHLORIDE, POTASSIUM CHLORIDE, SODIUM LACTATE AND CALCIUM CHLORIDE: 600; 310; 30; 20 INJECTION, SOLUTION INTRAVENOUS at 06:57

## 2017-11-06 ASSESSMENT — PAIN SCALES - GENERAL
PAINLEVEL_OUTOF10: 5
PAINLEVEL_OUTOF10: 8
PAINLEVEL_OUTOF10: 4
PAINLEVEL_OUTOF10: 4

## 2017-11-06 ASSESSMENT — PAIN DESCRIPTION - LOCATION: LOCATION: PERINEUM

## 2017-11-06 ASSESSMENT — PAIN DESCRIPTION - DESCRIPTORS: DESCRIPTORS: DISCOMFORT

## 2017-11-06 ASSESSMENT — LIFESTYLE VARIABLES: SMOKING_STATUS: 0

## 2017-11-06 ASSESSMENT — ENCOUNTER SYMPTOMS
SHORTNESS OF BREATH: 0
STRIDOR: 0

## 2017-11-06 ASSESSMENT — PAIN DESCRIPTION - PAIN TYPE: TYPE: ACUTE PAIN

## 2017-11-06 NOTE — PROGRESS NOTES
Obstetric/Gynecology Resident Interval Note    Fetal tachycardia resumed with position change. SVE: 8/90/0 per Dr. Annamarie Irvin. Fetal tachycardia was abrupt on monitor with FSE in place from baseline of 140 to 205. Tachycardia confirmed on handheld doppler as well as with ultrasound on pulsewave with HR of 209. Patient repositioned from right side to left side. Contractions every 2-4 minutes. Tachycardia present now for 12 minutes straight and resolved abruptly with a jump down to baseline of 140 bpm again. Fluid bolus started and maternal oxygen. Pitocin turned off (previously at 1ml/hr). NICU called to be present for delivery.     Dr Annamarie Irvin at bedside      Ankit Giang DO  OB[place]GYN Resident, PGY2  [facility] [place]  11/6/2017, 8:27 AM

## 2017-11-06 NOTE — ANESTHESIA PRE PROCEDURE
0.50 08/09/2017    GFRAA >60 08/09/2017    LABGLOM >60 08/09/2017    GLUCOSE 93 08/09/2017    PROT 6.5 08/09/2017    CALCIUM 9.0 08/09/2017    BILITOT 0.31 08/09/2017    ALKPHOS 86 08/09/2017    AST 12 08/09/2017    ALT 10 08/09/2017       POC Tests:   Recent Labs      11/06/17   0329   POCGLU  108*       Coags:   Lab Results   Component Value Date    PROTIME 9.8 11/09/2014    INR 1.0 11/09/2014    APTT 27.1 11/09/2014       HCG (If Applicable):   Lab Results   Component Value Date    PREGTESTUR positive 04/24/2017    HCGQUANT 33,255 (H) 03/28/2017        ABGs: No results found for: PHART, PO2ART, FSU3DBD, ADO9SVM, BEART, E3GRVVYH     Type & Screen (If Applicable):  No results found for: LABABO, [address]    Anesthesia Evaluation  Patient summary reviewed no history of anesthetic complications:   Airway: Mallampati: II  TM distance: >3 FB   Neck ROM: full  Mouth opening: > = 3 FB Dental: normal exam         Pulmonary:negative ROS and normal exam        (-) pneumonia, COPD, asthma, shortness of breath, recent URI, sleep apnea, rhonchi, wheezes, rales and stridor                           Cardiovascular:negative ROS  Exercise tolerance: good (>4 METS),       (-) pacemaker, hypertension, valvular problems/murmurs, past MI, CAD, CABG/stent, dysrhythmias,  angina,  CHF, orthopnea, PND,  H[place], murmur, weak pulses,  friction rub, systolic click, carotid bruit,  JVD and peripheral edema      Rhythm: regular  Rate: normal                    Neuro/Psych:   {neg ROS     (-) seizures, neuromuscular disease, TIA, CVA, headaches and psychiatric history           GI/Hepatic/Renal: neg ROS       (-) hiatal hernia, GERD, PUD, hepatitis, liver disease and bowel prep       Endo/Other: negative ROS       (-) hypothyroidism, hyperthyroidism, blood dyscrasia, arthritis, no Type II DM, no Type I DM               Abdominal:           Vascular:     - PVD, DVT and PE.                                   Anesthesia Plan      epidural and spinal

## 2017-11-06 NOTE — H&P
OBSTETRICAL HISTORY Lobo Moreno    Date: 2017       Time: 3:59 AM   Patient Name: Elliot Fraire     Patient : 1980  Room/Bed: 0180/0180-01    Admission Date/Time: 2017  2:33 AM      CC: \" I think my water broke\"     HPI: Elliot Fraire is a 40 y.o. [id number] at 38w4d who presents with complaint that her \"water broke\". She states that around 0150 she felt a pop and when she stood up she had a gush of fluid. She went to the bathroom and had another gush of fluid and continuous leaking of fluid. The patient reports fetal movement is present, complains of contractions, complains of loss of fluid, denies vaginal bleeding. Denies HA, vision changes, fevers, chills, N/V, RUQ pain, increase in swelling. This pregnancy is complicated by increased risk for Trisomy 21 1:96, elevated bHCG (NIPT wnl), polyhydramnios-RSLVD, H/O pancreatitis (lipase 106 on 17) and  AMA. DATING:  LMP: Patient's last menstrual period was 2017.   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    AMA (advanced maternal age) multigravida 35+    Low thyroid stimulating hormone (TSH) level    Abnormal first trimester screen: increased risk of fetal trisomy 24, 1:96    Abnormal first trimester screen, elevated free Beta-hCG level    Polyhydramnios-RSLVD    Suspected placental problem not found    Suspected damage to fetus from other disease in mother, affecting management of mother, antepartum condition or complication    H/O Mild Pancreatitis (Lipase 106) in pregnancy    Hx of Trichomonas in current pregnancy    Placental disorder        Steroids Given In This Pregnancy:  no     REVIEW OF  Abnormal first trimester screen, elevated free Beta-hCG level 2017     Priority: High     Fetal echo scheduled.   11/10/14 - M, Apg , Wt ? 11/10/2014     Priority: High    High Risk Pregnancy 2014     Priority: High     Rh-/RI/GBS neg      Rh negative status during pregnancy 2014     Priority: High     [place] at 28 weeks      History of induced       Priority: Low    History of miscarriage      Priority: Low    Placental disorder 10/06/2017    H/O Mild Pancreatitis (Lipase 106) in pregnancy 2017     Mild Pancreatitis likely secondary to cholithiasis                        -RUQ shows no evidence concerning of obstruction                        -Recommended Low fat diet                        -CMP & Lipase levels to be repeated in 1 wk        Hx of Trichomonas in current pregnancy 2017     Treated w/ 2g Flagyl on 17    JUAN DIEGO needed 17      Polyhydramnios-RSLVD 2017 polyhydramnios noted. Fetal echo completed- no obvious evidence of congenital heart defect. Lab slips given for Coxsackie virus A and B, CMV, Parvovirus B19, Toxoplasmosis, etc  Follow up in 4 weeks for fetal growth, BPP, umbilical artery dopplers  2017 [facility] WNL-follow up in 4 weeks with [facility] for ultrasound  11/3/17: JUDD WNL      Suspected placental problem not found 2017    Suspected damage to fetus from other disease in mother, affecting management of mother, antepartum condition or complication     Abnormal first trimester screen: increased risk of fetal trisomy 21, 1:96 2017     Fetal diploidy for chromosomes 21, 18, 13 confirmed through [facility]. Fetal echo scheduled.  AMA (advanced maternal age) multigravida 35+ 2017 declined invasive testing, opted for noninvasive prenatal testing through [address] [place] [address].     Advanced Maternal Age Counseling    If a woman is over the age of 28, she is considered to be of Advanced miscarriage than amniocentesis, since the procedure is done in early pregnancy. Amniocentesis is a procedure where a sample of fluid is removed from the amniotic sac for analysis and evaluation. During this procedure, fluid is removed by placing a long needle through the abdominal wall into the amniotic sac. The amniocentesis needle is typically guided into the sac with the help of ultrasound imaging. Once the needle is in the sac, a syringe is used to withdraw the clear mervat-colored amniotic fluid, which looks a bit like urine. NIPT, utilizes the maternal blood to test for fetal cells. These fetal cells are then karyotyped for genetic evaluation. All of these tests, CVS, NIPT and amniocentesis, let couples know if the fetus will have genetic abnormalities, and will help them make informed decisions regarding their pregnancy. Karyotyping by either CVS, NIPT or Amniocentesis is the ONLY way to confirm the genetic chromosomal structure regarding trisomy; Down's Syndrome, Edward's or Pateau's. [facility] was reviewed. If NIPT is positive then a confirmatory amniocentesis would be recommended to confirm the diagnosis. 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 Annamarie Irvin, who is agreeable. Steroids given this admission: No    Risks, benefits, alternatives and possible complications have been discussed in detail with the patient. Admission, and post admission procedures and expectations were discussed in detail. All questions were answered.     Attending's Name: Dr. Gary Olmstead DO  Ob/Gyn Resident PGY-2  11/6/2017, 3:59 AM

## 2017-11-06 NOTE — PROGRESS NOTES
Labor Progress Note    Barbara Mcguire is a 40 y.o. female [id number] at 38w3d  The patient was seen and examined. Her pain is well controlled with epidural. She reports more pressure. She reports fetal movement is present, complains of contractions, complains of loss of fluid, denies vaginal bleeding.        Vital Signs:  Vitals:    17 0607 17 0622 17 0637 17 0658   BP: 118/87 111/79 119/77 113/68   Pulse: 91 85 91 91   Resp: 16   16   Temp:    98.8 °F (37.1 °C)   TempSrc:    Infrared   SpO2:    94%   Weight:       Height:             FHT: 135, moderate variability, accelerations present, decelerations absent  Contractions: regular, every 2-4 minutes  Cervical Exam: 7 cm dilated, 90 effaced, 0 station  Pitocin: None    Membranes: Ruptured clear fluid, SROM @0150 on 17  Scalp Electrode in place: present  Intrauterine Pressure Catheter in Place: present    Interventions: none    Assessment/Plan:  Barbara Mcguire is a 40 y.o. female  at 38w4d SROM @ 0150/Spontaneous labor   - GBS negative, No indication for GBS prophylaxis   - FSE/IUPC in place   - Epidural ordered   - FHT: Category 1 reactive   - Will start pitocin at this time as patient's contractions are not adequate (about 150MVU)      Dr. Robb Vegas updated and in agreement with plan      Rich Leyden, DO  Ob/Gyn Resident  2017, 7:37 AM

## 2017-11-06 NOTE — PROGRESS NOTES
Patient Name: Joe Briones  Patient : 1980  Room/Bed: 0180180-01  Admission Date/Time: 2017  2:33 AM  MRN #: [id number]  [facility] #: [id number]    Date: 2017  Time: 9:17 AM        Joe Briones is a 40 y.o. female  Obstetric History       T1      L1     SAB1   TAB0   Ectopic0   Molar0   Multiple0   Live Births1       # Outcome Date GA Lbr Joe/2nd Weight Sex Delivery Anes PTL Lv   5 Current            4 Term 11/10/14 40w0d  7 lb 2.6 oz (3.25 kg) M Vag-Spont EPI  JAEL      Apgar1:  8                Apgar5: 9   3 SAB            2 2000           1 TAB                   Gestational Age:  38w3d      The patient was seen and examined. The details of her pelvic exam can be found in the EPIC flow section of her EMR. Her pain  is well controlled by epidural.  The baby is moving well. Tracing Review (Date of Tracing): There Is moderate Variability  Vitals:    17 0658 17 0805 17 0850 17 0904   BP: 113/68 119/81 112/81 113/72   Pulse: 91 84 86 103   Resp: 16 16 16 16   Temp: 98.8 °F (37.1 °C)  98.2 °F (36.8 °C)    TempSrc: Infrared  Infrared    SpO2: 94%  100% 100%   Weight:       Height:         FHT's are 130  The tracing is Category 1 .     Intervention:  None      Membranes Are: Ruptured clear fluid  Scalp Electrode in place: Yes  Intrauterine Pressure Catheter in Place: Yes    4 Week Lab Review:  Admission on 2017   Component Date Value Ref Range Status    WBC 2017 9.7  3.5 - 11.0 k/uL Final    RBC 2017 4.33  4.0 - 5.2 m/uL Final    Hemoglobin 2017 13.3  12.0 - 16.0 g/dL Final    Hematocrit 2017 39.2  36 - 46 % Final    MCV 2017 90.5  80 - 100 fL Final    MCH 2017 30.7  26 - 34 pg Final    MCHC 2017 33.9  31 - 37 g/dL Final    RDW 2017 15.0* 11.5 - 14.9 % Final    Platelets  212  150 - 450 k/uL Final    MPV 2017 7.8  6.0 - 12.0 fL Final    Differential Type 2017 NOT REPORTED   Final    Seg Neutrophils 11/06/2017 76  % Final    Lymphocytes 11/06/2017 17  % Final    Monocytes 11/06/2017 7  % Final    Eosinophils % 11/06/2017 0  % Final    Basophils 11/06/2017 0  % Final    Immature Granulocytes 11/06/2017 NOT REPORTED  0 % Final    Segs Absolute 11/06/2017 7.40  1.3 - 9.1 k/uL Final    Absolute Lymph # 11/06/2017 1.60  1.0 - 4.8 k/uL Final    Absolute Mono # 11/06/2017 0.60  0.1 - 1.3 k/uL Final    Absolute Eos # 11/06/2017 0.00  0.0 - 0.4 k/uL Final    Basophils # 11/06/2017 0.00  0.0 - 0.2 k/uL Final    Comment: Performed at [facility]: [address] [facility]. [address]   [phone]      Absolute Immature Granulocyte 11/06/2017 NOT REPORTED  0.00 - 0.30 k/uL Final    WBC Morphology 11/06/2017 NOT REPORTED   Final    RBC Morphology 11/06/2017 NOT REPORTED   Final    Platelet Estimate 07/08/2298 NOT REPORTED   Final    Expiration Date 11/06/2017 11/09/2017   Final    Arm Band Number 11/06/2017 [id number]   Final    ABO/Rh 11/06/2017 O NEGATIVE   Final    Antibody Screen 11/06/2017 NEGATIVE   Final    Comment: Performed at [facility]: [address] [facility].  [address]   [phone]      Amphetamine Screen, Ur 11/06/2017 NEGATIVE  NEG Final    Comment:       (Positive cutoff 1000 ng/mL)                  Barbiturate Screen, Ur 11/06/2017 NEGATIVE  NEG Final    Comment:       (Positive cutoff 200 ng/mL)                  Benzodiazepine Screen, Urine 11/06/2017 NEGATIVE  NEG Final    Comment:       (Positive cutoff 200 ng/mL)                  Cocaine Metabolite, Urine 11/06/2017 NEGATIVE  NEG Final    Comment:       (Positive cutoff 300 ng/mL)                  Methadone Screen, Urine 11/06/2017 NEGATIVE  NEG Final    Comment:       (Positive cutoff 300 ng/mL)                  Opiates, Urine 11/06/2017 NEGATIVE  NEG Final    Comment:       (Positive cutoff 300 ng/mL)                  Phencyclidine, Urine 11/06/2017 NEGATIVE  NEG Final    Comment:       (Positive cutoff 25 ng/mL)                  Propoxyphene, Urine 11/06/2017 NOT REPORTED  NEG Final    Cannabinoid Scrn, Ur 11/06/2017 NEGATIVE  NEG Final    Comment:       (Positive cutoff 50 ng/mL)                  Oxycodone Screen, Ur 11/06/2017 NEGATIVE  NEG Final    Comment:       (Positive cutoff 100 ng/mL)                  Methamphetamine, Urine 11/06/2017 NOT REPORTED  NEG Final    Tricyclic Antidepressants, Ur 11/06/2017 NOT REPORTED  NEG Final    MDMA URINE 11/06/2017 NOT REPORTED  NEG Final    Buprenorphine Urine 11/06/2017 NOT REPORTED  NEG Final    Test Information 11/06/2017 Assay provides medical screening only. The absence of expected drug(s) and/or   Final    Comment:  metabolite(s) may indicate diluted or adulterated urine, limitations of testing   or timing of collection. Testing for legal purposes should be confirmed by another method. To request   confirmation of test result, please call the lab within 7 days of sample   submission. Performed at [facility]: [facility] [address]   [phone]      Specimen Description 11/06/2017 . VAGINA Performed at [facility]: [email or web address] [address] [facility]. [place],   Final    Specimen Description 11/06/2017  [place] [id number] [phone]   Final    Special Requests 11/06/2017 NOT REPORTED   Final    Direct Exam 11/06/2017 POSITIVE for Trichomonas vaginalis*  Final    Direct Exam 11/06/2017 NEGATIVE for Candida sp. Final    Direct Exam 11/06/2017 NEGATIVE for Gardnerella vaginalis   Final    Direct Exam 11/06/2017 Method of testing is a DNA probe intended for detection and identification of   Final    Direct Exam 11/06/2017  Candida species, Gardnerella vaginalis, and Trichomonas vaginalis nucleic acid   Final    Direct Exam 11/06/2017  in vaginal fluid specimens from patients with symptoms of vaginitis/vaginosis.    Final    Status 11/06/2017 FINAL 11/06/2017 considered to be of Advanced Maternal Age, and with this title comes risks for mom and baby. Increased risk of Down Syndrome - the most common chromosomal birth defect (chromosomes - cells that carry genes and transmit heredity information)   Increased risk of miscarriage   20% increase at ages 28 to 44   35% increase at ages 42-38   Over 50% increase by age 39  Increased risk of  Section () for delivery method   Gestational diabetes - diabetes that develops for the first time during pregnancy. Women who have this could possibly have a very large baby who then is at risk for injuries during delivery. Pregnancy Induced Hypertension - High blood pressure   Placental Problems - one of the most common placental problems is placenta previa in which the placenta covers all or part of the uterine opening (cervix). This can cause severe bleeding during delivery and can make  delivery necessary. Even with all of these increased risks, with the advancement in medical procedures and testing, there are several ways to reduce your risk. They include early and regular prenatal care, taking the multivitamin with folic acid prescribed by your health care manager, beginning pregnancy at a healthy weight, not smoking or drinking alcoholic beverages, and eating healthy foods. There are tests that all pregnant women are encouraged to take, but there is additional prenatal testing that is regularly offered to any woman 28 or older because of the potential of increased risks to the mother and baby. These tests are chorionic villus sampling (CVS) and amniocentesis. Nancy Dodson is also available which is a non-invasive option for fetal karyotyping. With CVS, a small sample of cells (called chorionic villi) is taken from the placenta where it attached to the wall of the uterus. Chorionic villi are tiny parts of the placenta; therefore they have the same genes as the baby.  This test is 98% accurate, but can carry a slightly higher risk of miscarriage than amniocentesis, since the procedure is done in early pregnancy. Amniocentesis is a procedure where a sample of fluid is removed from the amniotic sac for analysis and evaluation. During this procedure, fluid is removed by placing a long needle through the abdominal wall into the amniotic sac. The amniocentesis needle is typically guided into the sac with the help of ultrasound imaging. Once the needle is in the sac, a syringe is used to withdraw the clear mervat-colored amniotic fluid, which looks a bit like urine. NIPT, utilizes the maternal blood to test for fetal cells. These fetal cells are then karyotyped for genetic evaluation. All of these tests, CVS, NIPT and amniocentesis, let couples know if the fetus will have genetic abnormalities, and will help them make informed decisions regarding their pregnancy. Karyotyping by either CVS, NIPT or Amniocentesis is the ONLY way to confirm the genetic chromosomal structure regarding trisomy; Down's Syndrome, Edward's or Pateau's. [facility] was reviewed. If NIPT is positive then a confirmatory amniocentesis would be recommended to confirm the diagnosis. 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 Intermittent fetal tachycardia confirmed by ultrasound. Overall reassuring category 1 tracing. NICU notified for attendance at delivery. Anesthesia notified of potential . Pt counseled if tachycardia does not resolve with intrauterine resuscitative measures she may require a primary . Procedure with risks/ complications reviewed          Plan:   1. Continue with current care plan  2.  C/w current management        Electronically signed by Alberta Ortega DO on 2017 at 9:17 AM

## 2017-11-06 NOTE — PROGRESS NOTES
Dr. Bailey [facility] at bedside.      SVE: 7-8cm/90/0    FHT: 130bpm, moderate variability, positive accels, early and variable decelerations  TOCO: every 2-4minutes    Plan:  Continue expectant management  Position changes  Pt comfortable with Epidural

## 2017-11-06 NOTE — L&D DELIVERY NOTE
Mother's Information     Labor Events     labor?:  No   Rupture date:  17 Rupture time:  0150   Rupture type:  Spontaneous=SROM   Fluid color:  Clear   Fluid odor:  None         Mother Delivery Information    Lacerations:  None   # of Repair Packets:  1   Vaginal Delivery Est. Blood Loss (mL):  200   Surgical or Additional Est. Blood Loss (mL):  0 (View Only):  Edit in Flowsheets   Combined Est. Blood Loss (mL):  200            Abbott, Baby Pending  Alexandre Westley [[id number]]     Events of Labor     labor?:  No    steroids?:  None   Cervical ripening date/time:     Antibiotics received during labor?:  No   Rupture date/time: 17 0150   Rupture type:  Spontaneous=SROM   Fluid color:  Clear   Fluid odor:  None   Labor complications:  None             Print Group Title    Labor onset date/time: 17 0633 EST   Dilation complete date/time:   17 3948   Start pushin2017 1160   Decision time (emergent ):            Anesthesia    Method:  Epidural             Assisted Delivery Details    Forceps attempted?:  No   Vacuum extractor attempted?:  No            Document Additional Attempt       Document Additional Attempt                       Shoulder Dystocia    Shoulder dystocia present?:  No            Add Second Maneuver      Add Third Maneuver      Add Fourth Maneuver      Add Fifth Maneuver      Add Sixth Maneuver      Add Seventh Maneuver      Add Eighth Maneuver      Add Ninth Maneuver              Presentation    Presentation:  Vertex   Position:  Left   _:  Occiput   _:  Anterior           Information     Changing the 's delivery date/time could affect patient care.:     Delivery date/time:   17 0958   Delivery type:  Vaginal, Spontaneous Delivery    Details:                Delivery Providers    Delivering clinician:  Carlos Kulkarni DO   Other personnel:   Provider Role   Zoey Gray RN Delivery Nurse   Alyson Francisco RN miscarriage      Priority: Low    Placental disorder 10/06/2017    H/O Mild Pancreatitis (Lipase 106) in pregnancy 07/29/2017     Overview Note:     Mild Pancreatitis likely secondary to cholithiasis                        -RUQ shows no evidence concerning of obstruction                        -Recommended Low fat diet                        -CMP & Lipase levels to be repeated in 1 wk        Trichomonas in current pregnancy 07/29/2017     Overview Note:     Treated w/ 2g Flagyl on 7/29/17  JUAN DIEGO needed 8/29/17 11/6/17 POSITIVE; will need JUAN DIEGO      Polyhydramnios-RSLVD 07/27/2017     Overview Note:     7/27/2017 polyhydramnios noted. Fetal echo completed- no obvious evidence of congenital heart defect. Lab slips given for Coxsackie virus A and B, CMV, Parvovirus B19, Toxoplasmosis, etc  Follow up in 4 weeks for fetal growth, BPP, umbilical artery dopplers  8/24/2017 JUDD WNL-follow up in 4 weeks with M for ultrasound  11/3/17: JUDD WNL      Suspected placental problem not found 07/27/2017    Suspected damage to fetus from other disease in mother, affecting management of mother, antepartum condition or complication 77/01/9329    Abnormal first trimester screen: increased risk of fetal trisomy 21, 1:96 06/29/2017     Overview Note:     Fetal diploidy for chromosomes 21, 18, 13 confirmed through [facility]. Fetal echo scheduled.  AMA (advanced maternal age) multigravida 35+ 04/24/2017     Overview Note:     5/11/2017 declined invasive testing, opted for noninvasive prenatal testing through [address] [place] [address]. Advanced Maternal Age Counseling    If a woman is over the age of 28, she is considered to be of Advanced Maternal Age, and with this title comes risks for mom and baby.    Increased risk of Down Syndrome - the most common chromosomal birth defect (chromosomes - cells that carry genes and transmit heredity information)   Increased risk of miscarriage   20% increase at ages 28 to 44   35% increase at ages 42-38 sac. The amniocentesis needle is typically guided into the sac with the help of ultrasound imaging. Once the needle is in the sac, a syringe is used to withdraw the clear mervat-colored amniotic fluid, which looks a bit like urine. NIPT, utilizes the maternal blood to test for fetal cells. These fetal cells are then karyotyped for genetic evaluation. All of these tests, CVS, NIPT and amniocentesis, let couples know if the fetus will have genetic abnormalities, and will help them make informed decisions regarding their pregnancy. Karyotyping by either CVS, NIPT or Amniocentesis is the ONLY way to confirm the genetic chromosomal structure regarding trisomy; Down's Syndrome, Edward's or Pateau's. [facility] was reviewed. If NIPT is positive then a confirmatory amniocentesis would be recommended to confirm the diagnosis.        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 Blood Loss (mL):  200   Surgical or Additional Est. Blood Loss (mL):  0 (View Only):  Edit in Flowsheets   Combined Est. Blood Loss (mL):  200            Abbott, Baby Pending  Renetta Virgen [[id number]]     Events of Labor     labor?:  No    steroids?:  None   Cervical ripening date/time:     Antibiotics received during labor?:  No   Rupture date/time: 17   Rupture type:  Spontaneous=SROM   Fluid Irritability: 2  2       Muscle Tone: 2  2       Respiratory Effort: 2  2       Total: 9  9                  Apgars Assigned By:  DR. [facility]          Delivery Information    Perineal lacerations:  None    Periurethral laceration:  left Repaired:  Yes   Vaginal laceration:  No    Cervical laceration:  No    Vaginal delivery est. blood loss (mL):  200   Surgical or additional est. blood loss (mL):  0 (View Only):  Edit in Flowsheets   Combined est. blood loss (mL):  200          Other Procedures    Procedures:  None                Living  infant(s) or Female    Cephalic  left occiput anterior  Other:       Amniotic Fluid was: Clear  A Nuchal Cord: was not present x 0  A Spontaneous Cry Was Noted: Yes  The Baby: was suctioned        The Placenta Was Removed:  intact, whole and that the umbilical cord had three vessels noted    cord gasses were obtained and sent to the lab, cord blood was obtained and sent to the lab, Pitocin, 20 milliunits in 1 liter of ringers lactate was administered, wide open, to assist with uterine contraction and vaginal lacerations were repaired    The umbilical cord had delayed clamping of 1 minute: Yes      Episiotomy: (none)  First degree laceration. Suture used for repair:  Vicryl 3.0. The Cervix Vagina & Rectum were inspected after the repair and found to be intact without any defects. Good sphincter tone was present.    Yes      Condition:  infant stable to general nursery and mother stable    Blood Type and Rh:   ABO/Rh   Date Value Ref Range Status   2017 O NEGATIVE  Final         Rubella Immunity Status:     Rubella Antibody, IGG   Date Value Ref Range Status   2017 152.2 IU/mL Final     Comment:                 REFERENCE RANGE:  <5.0       NON-REACTIVE (non-immune)  5.0 TO 9.9 EQUIVOCAL  >=10.0     REACTIVE     (immune)        NOTE: NEW REFERENCE RANGE  Performed at [facility] [address] [facility], [address] [place] [address] [phone]               Infant Feeding:

## 2017-11-06 NOTE — H&P
OB/GYN H&P  [facility]    Patient Name: Leonel Elmore     Patient : 1980  Room/Bed: 0940/1344-71  Admission Date/Time: 2017  4:26 PM  Primary Care Physician: Wale Washington DO    HPI: Leonel Elmore is a 40 y.o. female [id number] presents as a transfer from [facility] secondary to NICU admission of . She is PPD #0 s/p  . The patient denies any HA, vision changes, CP, SOB, RUQ pain, or LE swelling. Her pregnancy was complicated by an increased risk for Trisomy 21 (1:96), elevated bHCG (NIPT WNL), polyhydramnios (RSLVD), H/O pancreatitis (lipase 106 on 17) and AMA (NIPT WNL). REVIEW OF SYSTEMS:   A minimum of an eleven point review of systems was completed.     Constitutional: negative fever, negative chills  HEENT: negative visual disturbances, negative headaches  Respiratory: negative dyspnea, negative cough  Cardiovascular: negative chest pain,  negative palpitations  Gastrointestinal: positive abdominal pain s/p delivery, negative RUQ pain, negative N/V, negative diarrhea, negative constipation  Genitourinary: negative dysuria, negative vaginal discharge, positive vaginal bleeding  Dermatological: negative rash, negative wounds  Hematologic: negative bleeding/clotting disorder  Immunologic: negative recent illness, negative recent sick contact, negative allergic reactions  Lymphatic: negative lymph nodes  Musculoskeletal: negative back pain, negative myalgias, negative arthralgias  Neurological:  negative dizziness, negative weakness  Behavior/Psych: negative depression, negative anxiety  _______________________________________________________________________    OBSTETRICAL HISTORY:   Obstetric History       T2      L2     SAB1   TAB0   Ectopic0   Molar0   Multiple0   Live Births2       # Outcome Date GA Lbr Joe/2nd Weight Sex Delivery Anes PTL Lv   5 Term 17 38w4d 03:23 / 00:02 7 lb 1 oz (3.204 kg) F Vag-Spont EPI N JAEL      Name: ZULETA,BABY GIRL ZACH      Apgar1:  9                Apgar5: 9   4 Term 11/10/14 40w0d  7 lb 2.6 oz (3.25 kg) M Vag-Spont EPI  JAEL      Apgar1:  8                Apgar5: 9   3 2005           2 SAB            1 TAB                 PAST MEDICAL HISTORY:   has a past medical history of History of induced ; History of miscarriage; and Rh negative state in antepartum period. PAST SURGICAL HISTORY:   has a past surgical history that includes Dilation & curettage (, ); [place] tooth extraction; and LASIK.     ALLERGIES:  Allergies as of 2017    (No Known Allergies)     MEDICATIONS:  Current Facility-Administered Medications   Medication Dose Route Frequency Provider Last Rate Last Dose    acetaminophen (TYLENOL) tablet 1,000 mg  1,000 mg Oral Q6H PRN Monse N Barhorst, DO        simethicone (MYLICON) chewable tablet 80 mg  80 mg Oral Q6H PRN Monse N Barhorst, DO        docusate sodium (COLACE) capsule 100 mg  100 mg Oral BID Monse N Barhorst, DO   100 mg at 17    magnesium hydroxide (MILK OF MAGNESIA) 400 MG/5ML suspension 30 mL  30 mL Oral Daily PRN Monse N Barhorst, DO        ondansetron (ZOFRAN) tablet 8 mg  8 mg Oral Q8H PRN Monse N Barhorst, DO        Tetanus-Diphth-Acell Pertussis (BOOSTRIX) injection 0.5 mL  0.5 mL Intramuscular Prior to discharge Sumanth Hdz,         lanolin ointment   Topical PRN Sumanth Hdz, DO        witch hazel-glycerin (TUCKS) pad   Topical PRN Monse N Barhorst, DO        hydrocortisone (ANUSOL-HC) 2.5 % rectal cream   Topical Q2H PRN Monse N Barhorst, DO        benzocaine-menthol (DERMOPLAST) 20-0.5 % spray   Topical PRN Monse N Barhorst, DO        oxytocin (PITOCIN) 30 units in 500 mL infusion  1 nish-units/min Intravenous Continuous Monse N Barhorst, DO        naproxen (NAPROSYN) tablet 500 mg  500 mg Oral BID Monse N Barhorst, DO   500 mg at 17     Facility-Administered Medications Ordered in Other Encounters   Medication Dose Route Frequency Provider Last Rate Last Dose    fentaNYL (SUBLIMAZE) injection    PRN Anabella Michel MD   22 mcg at 11/06/17 0520    ropivacaine (NAROPIN) 0.2% injection 0.2%    Continuous PRN Anabella Michel MD 8 mL/hr at 11/06/17 0521 8 mL/hr at 11/06/17 0521     SOCIAL HISTORY:   reports that she has never smoked. She has never used smokeless tobacco. She reports that she does not drink alcohol or use drugs. ________________________________________________________________________                                    Leny Mantis:  Vitals:    11/06/17 1612 11/06/17 2000   BP: 118/77 103/66   Pulse: 79 95   Resp: 18 16   Temp: 98.1 °F (36.7 °C) 101.5 °F (38.6 °C)   TempSrc:  Oral   SpO2:  98%                                                                                                                                                                           PHYSICAL EXAM:     General Appearance: Appears healthy. Alert; in no acute distress. Pleasant. Skin: Skin color, texture, turgor normal. No rashes or lesions. Neck and EENT: normal atraumatic, no neck masses, normal thyroid  Respiratory: Normal expansion. Clear to auscultation. No rales, rhonchi, or wheezing. Cardiovascular: regular rate and rhythm, no murmurs, rubs, or gallops  Abdomen: soft, non-tender, non-distended and no right upper quadrant tenderness  Rectal Exam: not indicated  Musculoskeletal: no gross abnormalities  Extremities: non-tender BLE and non-edematous  Psych:  oriented to time, place and person, mood and affect are within normal limits     OMM EXAM:  The patient did not complain of a Chief complaint requiring OMM.   Chief Complaint: none    LAB RESULTS:    Lab Results   Component Value Date    PREGTESTUR positive 04/24/2017    HCGQUANT 33,255 (H) 03/28/2017     Lab Results   Component Value Date    WBC 9.7 11/06/2017    HGB 13.3 11/06/2017    HCT 39.2 11/06/2017    MCV 90.5 11/06/2017  2017     Lab Results   Component Value Date     2017    K 4.3 2017    CL 99 2017    CO2 24 2017    BUN 5 2017    CREATININE 0.50 2017    GLUCOSE 93 2017    CALCIUM 9.0 2017      ASSESSMENT & PLAN:    Harris Regan is a 40 y.o. female [id number] PPD #0 s/p  F APG 9/9 Wt 7#1   - Rh negative. Rhogam received. - ATSO Dr. Duane Muñoz   - H/H 11 AM. Hb 13.3 on 17.    - Motrin/Tylenol for pain control.    - Baby in NICU 2/2 SVT diagnosed @ time of delivery. Trichomonas   - S/p treatment w/ 2g Flagyl on 17    - No JUAN DIEGO available   - Vag probe positive on 17   - Repeat treatment given on 17    Patient Active Problem List    Diagnosis Date Noted     17 F APG 9/9 Wt 7#1      Priority: High    Abnormal first trimester screen, elevated free Beta-hCG level 2017     Priority: High     Fetal echo scheduled.   11/10/14 M APG 8/9 Wt 7#2 11/10/2014     Priority: High    High Risk Pregnancy 2014     Priority: High     Rh-/RI/GBS neg      Rhneg/RI/GBSneg 2014     Priority: High     Rhogam given 17      H/o Low TSH level in preg 2017     Priority: Medium     TSH 0.24 early in pregnancy. 17 labs redrawn and TSH 0.73.      H/O Mild Pancreatitis (Lipase 106) in pregnancy 2017     Priority: Medium     Mild Pancreatitis likely secondary to cholithiasis                        -RUQ shows no evidence concerning of obstruction                        -Recommended Low fat diet                        -CMP & Lipase levels to be repeated in 1 wk        Trichomonas in current pregnancy 2017     Priority: Medium     Treated w/ 2g Flagyl on 17  JUAN DIEGO needed 17 POSITIVE; treated with 2g Flagyl, will need JUAN DIEGO      Polyhydramnios-RSLVD 2017     Priority: Medium     2017 polyhydramnios noted. Fetal echo completed- no obvious evidence of congenital heart defect.   Lab  History of induced  x 1      Priority: Low    History of miscarriage x 2      Priority: Low     Plan discussed with Dr. Latisha Gore, who is agreeable.      Attending's Name: Dr. Jeni Wang DO  Ob/Gyn Resident  Pager: [phone]  2017, 5:54 AM

## 2017-11-06 NOTE — PLAN OF CARE
Problem: Discharge Planning:  Goal: Discharged to appropriate level of care  Discharged to appropriate level of care   Outcome: Ongoing      Problem: Fluid Volume - Imbalance:  Goal: Absence of imbalanced fluid volume signs and symptoms  Absence of imbalanced fluid volume signs and symptoms   Outcome: Ongoing    Goal: Absence of postpartum hemorrhage signs and symptoms  Absence of postpartum hemorrhage signs and symptoms   Outcome: Ongoing

## 2017-11-06 NOTE — FLOWSHEET NOTE
Patient transport arranged. [facility] to transport patient to [address] [place] [address].  Room #725 contact number [phone],  Report called to Jose Joel on post partum unit.

## 2017-11-06 NOTE — FLOWSHEET NOTE
Patient pushing with Dr. Remigio Zamora at bedside, Dr. Zoey Levine also at bedside. IV bolus started. Patient cont. To push with each contraciton. Kerr removed.   09:53AM

## 2017-11-06 NOTE — PROGRESS NOTES
Dr. Gretta Ward at bedside.      SVE: 5-6cm dilated    FHT: 135bpm, moderate variability, positive accels, early decelerations  TOCO: every 2-3minutes    Plan:  Continue expectant management  Position changes  Pt comfortable with Epidural

## 2017-11-06 NOTE — PROGRESS NOTES
Patient Name: Barbara Born  Patient : 1980  Room/Bed: 0180/0180-01  Admission Date/Time: 2017  2:33 AM  MRN #: [id number]  [facility] #: [id number]    Date: 2017  Time: 6:05 AM        Barbara Born is a 40 y.o. female  Obstetric History       T1      L1     SAB1   TAB0   Ectopic0   Molar0   Multiple0   Live Births1       # Outcome Date GA Lbr Joe/2nd Weight Sex Delivery Anes PTL Lv   5 Current            4 Term 11/10/14 40w0d  7 lb 2.6 oz (3.25 kg) M Vag-Spont EPI  JAEL      Apgar1:  8                Apgar5: 9   3 SAB            2 2000           1 TAB                   Gestational Age:  38w3d      The patient was seen and examined. The details of her pelvic exam can be found in the EPIC flow section of her EMR. Her pain  is well controlled by epidural.  The baby is moving well. Tracing Review (Date of Tracing): There Is moderate Variability  Vitals:    17 0522 17 0537 17 0553 17 0600   BP: 129/62 113/61 119/87    Pulse: 93 75 103    Resp: (P) 18 (P) 18 18    Temp:    (P) 98.7 °F (37.1 °C)   TempSrc:    (P) Oral   SpO2: 97%      Weight:       Height:         FHT's are 130  The tracing is Category 1. Period of abrupt increase to 200 bpm x 3 minutes with abrupt return to baseline- did not appear continuous with strip. ISL in place. Category 1 FHR tracing .     Intervention:  pt turned to sides witth peanut ball in place      Membranes Are: Ruptured clear fluid  Scalp Electrode in place: Yes  Intrauterine Pressure Catheter in Place: Yes      4 Week Lab Review:  Admission on 2017   Component Date Value Ref Range Status    WBC 2017 9.7  3.5 - 11.0 k/uL Final    RBC 2017 4.33  4.0 - 5.2 m/uL Final    Hemoglobin 2017 13.3  12.0 - 16.0 g/dL Final    Hematocrit 2017 39.2  36 - 46 % Final    MCV 2017 90.5  80 - 100 fL Final    MCH 2017 30.7  26 - 34 pg Final    MCHC 2017 33.9  31 - 37 g/dL Final    RDW 300 ng/mL)                  Opiates, Urine 11/06/2017 NEGATIVE  NEG Final    Comment:       (Positive cutoff 300 ng/mL)                  Phencyclidine, Urine 11/06/2017 NEGATIVE  NEG Final    Comment:       (Positive cutoff 25 ng/mL)                  Propoxyphene, Urine 11/06/2017 NOT REPORTED  NEG Final    Cannabinoid Scrn, Ur 11/06/2017 NEGATIVE  NEG Final    Comment:       (Positive cutoff 50 ng/mL)                  Oxycodone Screen, Ur 11/06/2017 NEGATIVE  NEG Final    Comment:       (Positive cutoff 100 ng/mL)                  Methamphetamine, Urine 11/06/2017 NOT REPORTED  NEG Final    Tricyclic Antidepressants, Ur 11/06/2017 NOT REPORTED  NEG Final    MDMA URINE 11/06/2017 NOT REPORTED  NEG Final    Buprenorphine Urine 11/06/2017 NOT REPORTED  NEG Final    Test Information 11/06/2017 Assay provides medical screening only. The absence of expected drug(s) and/or   Final    Comment:  metabolite(s) may indicate diluted or adulterated urine, limitations of testing   or timing of collection. Testing for legal purposes should be confirmed by another method. To request   confirmation of test result, please call the lab within 7 days of sample   submission. Performed at [facility]: [email or web address], [address] [place] [address]   [phone]      Specimen Description 11/06/2017 . VAGINA Performed at [facility]: [email or web address],   Final    Specimen Description 11/06/2017  [place] [id number] [phone]   Final    Special Requests 11/06/2017 NOT REPORTED   Final    Direct Exam 11/06/2017 POSITIVE for Trichomonas vaginalis*  Final    Direct Exam 11/06/2017 NEGATIVE for Candida sp.    Final    Direct Exam 11/06/2017 NEGATIVE for Gardnerella vaginalis   Final    Direct Exam 11/06/2017 Method of testing is a DNA probe intended for detection and identification of   Final    Direct Exam 11/06/2017  Candida species, Gardnerella vaginalis, and Trichomonas vaginalis nucleic acid   Final    Direct Exam 2017  in vaginal fluid specimens from patients with symptoms of vaginitis/vaginosis. Final    Status 2017 FINAL 2017   Final    POC Glucose 2017 108* 65 - 105 mg/dL Final    TSH 2017 0.73  0.30 - 5.00 mIU/L Final    Comment: Performed at [facility]: [facility] [address]   ([address] Outpatient Visit on 10/24/2017   Component Date Value Ref Range Status    Specimen Description 10/27/2017 . VAGINAL SPECIMEN Performed at [facility]: [facility] [address] Final    Specimen Description 10/27/2017  [place] [address] [place] [address] [phone]   Final    Special Requests 10/27/2017 NOT REPORTED   Final    Culture 10/27/2017 NEGATIVE FOR GROUP B STREPTOCOCCI   Final    Culture 10/27/2017 Performed at [address] [place] [address] Drive [address] [address] [phone]   Final    Status 10/27/2017 FINAL 10/27/2017   Final   ]    /87   Pulse 103   Temp (P) 98.7 °F (37.1 °C) (Oral)   Resp 18   Ht 5' 5\" (1.651 m)   Wt 200 lb (90.7 kg)   LMP 2017   SpO2 97%   BMI 33.28 kg/m²       Pelvic Exam:  Cervix Check:     DILATION:  5-6 cm   EFFACEMENT:   90%   STATION:  0 cm   CONSISTENCY:  soft   POSITION:  mid    FETAL POSITION: Cephalic,     Assessment:  1Malika Adam is a 40 y.o. female  38w4d     2.   OB History    Para Term  AB Living   5 1 1 0 3 1   SAB TAB Ectopic Molar Multiple Live Births   2 1       1      # Outcome Date GA Lbr Joe/2nd Weight Sex Delivery Anes PTL Lv   5 Current            4 Term 11/10/14 40w0d  7 lb 2.6 oz (3.25 kg) M Vag-Spont EPI  JAEL      Birth Comments: 14 1829    Education information given to mother and she verbalizes understanding about the following:      Infant security. Patient safety. Skin to Skin Contact for You and Your Baby. Hour for family beginnings. Benefits of breastfeeding.        3 SAB            2 SAB  1 TAB                     3. HRP (high risk pregnancy), third trimester [O09.93]      4. Patient Active Problem List    Diagnosis Date Noted    Abnormal first trimester screen, elevated free Beta-hCG level 2017     Priority: High     Fetal echo scheduled.   11/10/14 - M, Apg , Wt ? 11/10/2014     Priority: High    High Risk Pregnancy 2014     Priority: High     Rh-/RI/GBS neg      Rh negative status during pregnancy 2014     Priority: High     Rhogham at 28 weeks      History of induced       Priority: Low    History of miscarriage      Priority: Low    Placental disorder 10/06/2017    H/O Mild Pancreatitis (Lipase 106) in pregnancy 2017     Mild Pancreatitis likely secondary to cholithiasis                        -RUQ shows no evidence concerning of obstruction                        -Recommended Low fat diet                        -CMP & Lipase levels to be repeated in 1 wk        Trichomonas in current pregnancy 2017     Treated w/ 2g Flagyl on 17  JUAN DIEGO needed 17 POSITIVE      Polyhydramnios-RSLVD 2017 polyhydramnios noted. Fetal echo completed- no obvious evidence of congenital heart defect. Lab slips given for Coxsackie virus A and B, CMV, Parvovirus B19, Toxoplasmosis, etc  Follow up in 4 weeks for fetal growth, BPP, umbilical artery dopplers  2017 JUDD WNL-follow up in [facility] weeks with [facility]  11/3/17: JUDD WNL      Suspected placental problem not found 2017    Suspected damage to fetus from other disease in mother, affecting management of mother, antepartum condition or complication     Abnormal first trimester screen: increased risk of fetal trisomy 21, 1:96 2017     Fetal diploidy for chromosomes 21, 18, 13 confirmed through [facility]. Fetal echo scheduled.       AMA (advanced maternal age) multigravida 35+ 2017 declined invasive testing, opted Chorionic villi are tiny parts of the placenta; therefore they have the same genes as the baby. This test is 98% accurate, but can carry a slightly higher risk of miscarriage than amniocentesis, since the procedure is done in early pregnancy. Amniocentesis is a procedure where a sample of fluid is removed from the amniotic sac for analysis and evaluation. During this procedure, fluid is removed by placing a long needle through the abdominal wall into the amniotic sac. The amniocentesis needle is typically guided into the sac with the help of ultrasound imaging. Once the needle is in the sac, a syringe is used to withdraw the clear mervat-colored amniotic fluid, which looks a bit like urine. NIPT, utilizes the maternal blood to test for fetal cells. These fetal cells are then karyotyped for genetic evaluation. All of these tests, CVS, NIPT and amniocentesis, let couples know if the fetus will have genetic abnormalities, and will help them make informed decisions regarding their pregnancy. Karyotyping by either CVS, NIPT or Amniocentesis is the ONLY way to confirm the genetic chromosomal structure regarding trisomy; Down's Syndrome, Edward's or Pateau's. [facility] was reviewed. If NIPT is positive then a confirmatory amniocentesis would be recommended to confirm the diagnosis. 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 SROM          Plan:   1. Continue with current care plan  2.  C/w current management        Electronically signed by Ita Beal DO on 11/6/2017 at 6:05 AM

## 2017-11-06 NOTE — PROGRESS NOTES
FHT returned to a baseline of 140bpm, moderate variability, positive accels, negative decels  TOCO: jose every 2-4 minutes    FSE/IUPC in place  Patient received position changes, placed in left lateral position  IVF bolus running    Updated Dr. Vivian Greco to monitor

## 2017-11-06 NOTE — DISCHARGE SUMMARY
hyperthyroidism with a low TSH of  0.24 in 2017. TSH drawn on admission and was found to be wnl at 0.73. Patient noted to be positive for Trichomonas, stating partner was never treated for infection. Instructed that significant other needs to be treated with both of them obtaining TOCs prior to resuming intercourse. Intermittent Fetal tachycardia noted on monitor (both prior to and with FSE in place) but otherwise Category 1 tracing. Fetal tachycardia confirmed via bedside ultrasound. NICU called for delivery for potential  morbidity. NICU present for delivery. She delivered by spontaneous vaginal a Live Born infant female on 2017. Information for the patient's :  Walt Champagne Girl Shanna Phillips [[id number]]   female  Birth Weight: 7 lb 1 oz (3.204 kg)        Apgar scores: 9 at 1 minute and 9 at 5 minutes. Postpartum course: Patient received 2g Flagyl postpartum for trichomonal infection. Rhogam workup was ordered, baby was found to be Rh positive, and patient received Rhogam on PPD#0 at [address] [place] [address]. Patient was transferred to [facility] on PPD#0, as her baby went the the NICU at [facility].      Course of patient: uncomplicated    Discharge to: Transfer to [facility]    Readmission planned: yes, for postpartum care      Recommendations on Discharge:     Medications:      Medication List      START taking these medications    docusate 100 MG Caps  Commonly known as:  COLACE, DULCOLAX  Take 100 mg by mouth 2 times daily     naproxen 500 MG EC tablet  Commonly known as:  EC NAPROSYN  Take 1 tablet by mouth 2 times daily        CONTINUE taking these medications    Prenatal Vitamins 0.8 MG Tabs  Take 1 tablet by mouth daily           Where to Get Your Medications      You can get these medications from any pharmacy    Bring a paper prescription for each of these medications  · docusate 100 MG Caps  · naproxen 500 MG EC tablet           Activity: pelvic rest x 6 weeks, no lifting

## 2017-11-06 NOTE — PROGRESS NOTES
Education information given to mother and she verbalizes understanding about the following:  Hour for [employer]. Patient Safety Education. Infant security including the four band system and the HUGS system. Skin to Skin Contact for You and Your Baby. Benefits of breastfeeding. Risks of formula given and discussed with mother. What do the experts say about the use of pacifiers/supplementation of a  infant?   Safe sleep for your baby (supplied by [address])     Mother chooses to formula feed

## 2017-11-07 VITALS
DIASTOLIC BLOOD PRESSURE: 76 MMHG | SYSTOLIC BLOOD PRESSURE: 114 MMHG | HEART RATE: 75 BPM | TEMPERATURE: 97.5 F | RESPIRATION RATE: 15 BRPM | OXYGEN SATURATION: 98 %

## 2017-11-07 LAB
HCT VFR BLD CALC: 34.5 % (ref 36.3–47.1)
HEMOGLOBIN: 11 G/DL (ref 11.9–15.1)

## 2017-11-07 PROCEDURE — 85018 HEMOGLOBIN: CPT

## 2017-11-07 PROCEDURE — 85014 HEMATOCRIT: CPT

## 2017-11-07 PROCEDURE — 6370000000 HC RX 637 (ALT 250 FOR IP): Performed by: STUDENT IN AN ORGANIZED HEALTH CARE EDUCATION/TRAINING PROGRAM

## 2017-11-07 PROCEDURE — 36415 COLL VENOUS BLD VENIPUNCTURE: CPT

## 2017-11-07 RX ORDER — IBUPROFEN 800 MG/1
800 TABLET ORAL EVERY 8 HOURS PRN
Qty: 60 TABLET | Refills: 0 | Status: SHIPPED | OUTPATIENT
Start: 2017-11-07 | End: 2017-11-20

## 2017-11-07 RX ORDER — PSEUDOEPHEDRINE HCL 30 MG
100 TABLET ORAL 2 TIMES DAILY
Qty: 60 CAPSULE | Refills: 0 | Status: SHIPPED | OUTPATIENT
Start: 2017-11-07 | End: 2017-11-20

## 2017-11-07 RX ORDER — IBUPROFEN 800 MG/1
800 TABLET ORAL EVERY 8 HOURS PRN
Status: DISCONTINUED | OUTPATIENT
Start: 2017-11-07 | End: 2017-11-07 | Stop reason: HOSPADM

## 2017-11-07 RX ADMIN — IBUPROFEN 800 MG: 800 TABLET, FILM COATED ORAL at 08:03

## 2017-11-07 RX ADMIN — DOCUSATE SODIUM 100 MG: 100 TABLET, FILM COATED ORAL at 08:03

## 2017-11-07 ASSESSMENT — PAIN DESCRIPTION - LOCATION: LOCATION: BACK

## 2017-11-07 ASSESSMENT — PAIN SCALES - GENERAL: PAINLEVEL_OUTOF10: 4

## 2017-11-07 NOTE — PROGRESS NOTES
Obstetrical Rounds:    PPD#: 17[address] [facility] Day: 2  Procedure: normal spontaneous vaginal delivery    Date: 2017  Time: 5:53 AM        Patient Name: Elliot Fraire  Patient : 1980  Room/Bed: 7769/3537-38  Admission Date/Time: 2017  4:26 PM  MRN #: [id number]  [facility] #: [id number]        Attending Physician Statement  I have discussed the care of Elliot Fraire, including pertinent history and exam findings,  with the resident. I have reviewed their note in the electronic medical record. I have seen and examined the patient and the key elements of all parts of the encounter have been performed/reviewed by me . I agree with the assessment, plan and orders as documented by the resident. Pt without c/c. Vitals:    17   BP: 103/66   Pulse: 95   Resp: 16   Temp: 101.5 °F (38.6 °C)   SpO2: 98%       No visits with results within 1 Day(s) from this visit.    Latest known visit with results is:   Admission on 2017, Discharged on 2017   Component Date Value Ref Range Status    WBC 2017 9.7  3.5 - 11.0 k/uL Final    RBC 2017 4.33  4.0 - 5.2 m/uL Final    Hemoglobin 2017 13.3  12.0 - 16.0 g/dL Final    Hematocrit 2017 39.2  36 - 46 % Final    MCV 2017 90.5  80 - 100 fL Final    MCH 2017 30.7  26 - 34 pg Final    MCHC 2017 33.9  31 - 37 g/dL Final    RDW 2017 15.0* 11.5 - 14.9 % Final    Platelets  212  150 - 450 k/uL Final    MPV 2017 7.8  6.0 - 12.0 fL Final    Differential Type 2017 NOT REPORTED   Final    Seg Neutrophils 2017 76  % Final    Lymphocytes 2017 17  % Final    Monocytes 2017 7  % Final    Eosinophils % 2017 0  % Final    Basophils 2017 0  % Final    Immature Granulocytes 2017 NOT REPORTED  0 % Final    Segs Absolute 2017 7.40  1.3 - 9.1 k/uL Final    Absolute Lymph # 2017 1.60  1.0 - 4.8 k/uL Final    Absolute Mono # 2017 0.60  0.1 - 1.3 k/uL Final    Absolute Eos # 11/06/2017 0.00  0.0 - 0.4 k/uL Final    Basophils # 11/06/2017 0.00  0.0 - 0.2 k/uL Final    Comment: Performed at [facility]: WASHINGTON WILSON W [address] [facility]. [address]   [phone]      Absolute Immature Granulocyte 11/06/2017 NOT REPORTED  0.00 - 0.30 k/uL Final    WBC Morphology 11/06/2017 NOT REPORTED   Final    RBC Morphology 11/06/2017 NOT REPORTED   Final    Platelet Estimate 48/14/4507 NOT REPORTED   Final    Expiration Date 11/06/2017 11/09/2017   Final    Arm Band Number 11/06/2017 [id number]   Final    ABO/Rh 11/06/2017 O NEGATIVE   Final    Antibody Screen 11/06/2017 NEGATIVE   Final    Comment: [facility]formed at [facility] [address] [facility]. [address]   [phone]      T. pallidum, IgG 11/06/2017 NONREACTIVE  NR Final    Comment:       T. pallidum antibodies are not detected. There is no serological evidence of infection with T. pallidum (early primary   syphilis cannot be excluded). Retest in 2-4 weeks if syphilis is clinically   suspect.         Performed at [facility], [address] [place] [address] [phone]      Amphetamine Screen, Ur 11/06/2017 NEGATIVE  NEG Final    Comment:       (Positive cutoff 1000 ng/mL)                  Barbiturate Screen, Ur 11/06/2017 NEGATIVE  NEG Final    Comment:       (Positive cutoff 200 ng/mL)                  Benzodiazepine Screen, Urine 11/06/2017 NEGATIVE  NEG Final    Comment:       (Positive cutoff 200 ng/mL)                  Cocaine Metabolite, Urine 11/06/2017 NEGATIVE  NEG Final    Comment:       (Positive cutoff 300 ng/mL)                  Methadone Screen, Urine 11/06/2017 NEGATIVE  NEG Final    Comment:       (Positive cutoff 300 ng/mL)                  Opiates, Urine 11/06/2017 NEGATIVE  NEG Final    Comment:       (Positive cutoff 300 ng/mL)                  Phencyclidine, Urine 11/06/2017 NEGATIVE  NEG Final    Comment:       (Positive cutoff 25 ng/mL)                  Propoxyphene, Urine 11/06/2017 NOT REPORTED  NEG Final    Cannabinoid Scrn, Ur 11/06/2017 NEGATIVE  NEG Final    Comment:       (Positive cutoff 50 ng/mL)                  Oxycodone Screen, Ur 11/06/2017 NEGATIVE  NEG Final    Comment:       (Positive cutoff 100 ng/mL)                  Methamphetamine, Urine 11/06/2017 NOT REPORTED  NEG Final    Tricyclic Antidepressants, Ur 11/06/2017 NOT REPORTED  NEG Final    MDMA URINE 11/06/2017 NOT REPORTED  NEG Final    Buprenorphine Urine 11/06/2017 NOT REPORTED  NEG Final    Test Information 11/06/2017 Assay provides medical screening only. The absence of expected drug(s) and/or   Final    Comment:  metabolite(s) may indicate diluted or adulterated urine, limitations of testing   or timing of collection. Testing for legal purposes should be confirmed by another method. To request   confirmation of test result, please call the lab within 7 days of sample   submission. Performed at [facility]: WASHINGTON STEVE W [address] [place] [address], [address] [place] [address]   [phone]      Specimen Description 11/06/2017 . VAGINA Performed at [facility]: IencuentraCARLOS STEVE W [address] [place] [address],   Final    Specimen Description 11/06/2017  [place] [id number] [phone]   Final    Special Requests 11/06/2017 NOT REPORTED   Final    Direct Exam 11/06/2017 POSITIVE for Trichomonas vaginalis*  Final    Direct Exam 11/06/2017 NEGATIVE for Candida sp. Final    Direct Exam 11/06/2017 NEGATIVE for Gardnerella vaginalis   Final    Direct Exam 11/06/2017 Method of testing is a DNA probe intended for detection and identification of   Final    Direct Exam 11/06/2017  Candida species, Gardnerella vaginalis, and Trichomonas vaginalis nucleic acid   Final    Direct Exam 11/06/2017  in vaginal fluid specimens from patients with symptoms of vaginitis/vaginosis.    Final    Status 11/06/2017 FINAL 11/06/2017   Final    POC Glucose 11/06/2017 108* 65 - 105 mg/dL Final    11/06/2017 ISSUED   Final    Transfusion Status 11/06/2017 OK TO TRANSFUSE   Final    Comment: Performed at [facility]: WASHINGTON WILSON LOIDA [address]   [phone]             Discharge Instructions for Labor and Delivery, Vaginal Birth     A vaginal birth refers to the baby being delivered through the vagina. The amount of time that labor can take varies greatly. Labor for the average first-born baby is about 12 hours. Usually your hospital stay after a routine delivery is no more than two nights. Some new mothers go home the same day. Recovery from childbirth varies depending upon whether you had an episiotomy (an incision in the perineum, the area between your vaginal opening and your anus), the duration of labor and delivery, and the amount of rest you get. In general, it takes about 6-8 weeks for a woman's body to recover from childbirth. What You Will Need   Along with your medications, you will need the following:   · Sanitary pads    · Nursing pads    · Witch hazel pads    · Sitz bath    Steps to [address] will want to arrange for transportation home for you and your baby. The baby will need a car seat. You will receive instructions in the hospital for breastfeeding and taking care of the perineum area. You may use ointment for cracked nipples or warm water rinses to your perineum. · You will need to wear sanitary pads for about six weeks after delivery. · If you had an episiotomy or vaginal tear, you will be sent home with a plastic squirt bottle. Fill it with warm water and squirt over the vaginal and anal area every time you urinate and defecate. · Warm baths can be soothing to healing tissues. · Apply warm or cold cloths to sore breasts. · Apply ointment to cracked nipples. · Use nursing pads for leaky breast.    · Apply witch hazel pads to sore perineum (area between vagina and anus).     · Ask your doctor about when it is safe for you to shower or bathe. · Sit in a sitz bath to soothe sore perineum and/or hemorrhoids. A sitz bath is soaking the hip and buttocks area in warm water. You can buy a plastic sitz bath at most [employer]. It will fit on your toilet. You can also use your bathtub. Diet    Eat a well balanced, healthy diet to help your recover from childbirth. If you are breastfeeding, you will need additional calories each day. You may also be advised to avoid certain foods. Some women experience constipation after childbirth. To avoid this problem:   · Drink plenty of fluids. · Eat foods high in fiber, such as:   ¨ Whole grain cereals and breads   ¨ Fruits and vegetables   ¨ Legumes (eg, beans, lentils)   Physical Activity    Labor and delivery is tiring. Rest when you can to regain your energy. Your doctor will encourage you to exercise by walking. Ask your doctor when you will be able to return to more strenuous exercise. Your doctor may suggest you do Kegel exercises to strengthen your pelvic muscles. To do these tighten your muscles as if you were stopping your urine flow. Hold for a few seconds and then relax. Do these throughout the day. Medications    Breastfeeding can cause your uterus to contract. If painful, your doctor may recommend a pain reliever. If you are breastfeeding, it's important to ask your doctor about taking medications. You may receive from the hospital a list of medications to avoid. Once your doctor has approved your medications, it's important to:   · Take your medication as directed. Do not change the amount or the schedule. · Do not stop taking them without talking to your doctor. · Do not share them. · Know what the results and side effects may be. Report bothersome side effects to your doctor. · Some drugs can be dangerous when mixed. Talk to a doctor or pharmacist if you are taking more than one drug. This includes over-the-counter medication and herb or dietary supplements.    · Plan ahead for refills so you don't run out. Lifestyle Changes    Having a baby requires significant lifestyle changes. You and your doctor will plan lifestyle changes that will help you recover. Some things to keep in mind include:   · It is important to get enough rest so you can recover. Try sleeping when the baby sleeps. · Ask your doctor when you can resume sexual relations. If you have not done so already, talk to your doctor about birth control options. · If you are breastfeeding, consider a breast pump. · Call your obstetrician and/or pediatrician for any questions that arise. · Understand the changes your body is going through as it recovers from childbirth:   ¨ Hot and cold flashes as your body adjusts to new hormone and blood flow levels   ¨ Urinary or fecal incontinence due to stretched muscles   ¨ After pains from uterine contractions as the uterus shrinks   ¨ Vaginal bleeding (called lochia), which is heavier than your period (generally stops within two months)   ¨ Baby blues, feelings of irritability, sadness, crying, or anxiety. Postpartum depression is more severe, occurring in 10%-20% of new moms. If you experience such symptoms, contact your doctor. · Consider joining a support group for new mothers. You can get encouragement and learn parenting strategies. Follow-up   Schedule a follow-up appointment as directed by your doctor. Call Your Doctor If Any of the Following Occurs   It is important for you to monitor your recovery once you leave the hospital. That way, you can alert your doctor to any problems immediately.  If any of the following occurs, call your doctor:   · Signs of infection, including fever and chills    · Increased bleeding: soaking more than one sanitary pad an hour    · Wounds that become red, swollen or drain pus    · Vaginal discharge that smells foul    · New pain, swelling, or tenderness in your legs    · Pain that you can't control with the medications you've

## 2017-11-07 NOTE — PLAN OF CARE
Problem: Pain:  Goal: Pain level will decrease  Pain level will decrease   Outcome: Ongoing      Problem: Fluid Volume - Imbalance:  Goal: Absence of imbalanced fluid volume signs and symptoms  Absence of imbalanced fluid volume signs and symptoms   Outcome: Ongoing    Goal: Absence of postpartum hemorrhage signs and symptoms  Absence of postpartum hemorrhage signs and symptoms   Outcome: Ongoing

## 2017-11-07 NOTE — DISCHARGE SUMMARY
Tabs  Take 1 tablet by mouth daily           Where to Get Your Medications      You can get these medications from any pharmacy    Bring a paper prescription for each of these medications  · docusate 100 MG Caps  · ibuprofen 800 MG tablet           Activity: pelvic rest x 6 weeks, no lifting greater than 15 lbs  Diet: regular diet  Follow up: 2 weeks   Condition on discharge: good  Discharge date: 11/7/17    Lubna Burger DO  Ob/Gyn Resident    Comments:  Home care and follow-up care were reviewed. Pelvic rest, and birth control were reviewed. Signs and symptoms of mastitis and post partum depression were reviewed. The patient is to notify her physician if any of these occur. The patient was counseled on secondary smoke risks and the increased risk of sudden infant death syndrome and respiratory problems to her baby with exposure. She was counseled on various alternate recommendations to decrease the exposure to secondary smoke to her children.

## 2017-11-08 LAB
ABO/RH: NORMAL
ANTIBODY SCREEN: NORMAL
BLD PROD TYP BPU: NORMAL
BLD PROD TYP BPU: NORMAL
DISPENSE STATUS BLOOD BANK: NORMAL
DU ANTIGEN: NORMAL
FETAL ROSETTE: NEGATIVE
HISTORY CHECK: NORMAL
Lab: 1
RHIG ELIGIBILITY: NORMAL
SURGICAL PATHOLOGY REPORT: NORMAL
TRANSFUSION STATUS: NORMAL
UNIT DIVISION: 0
UNIT NUMBER: NORMAL

## 2017-11-20 ENCOUNTER — OFFICE VISIT (OUTPATIENT)
Dept: OBGYN CLINIC | Age: 37
End: 2017-11-20

## 2017-11-20 VITALS
WEIGHT: 190 LBS | BODY MASS INDEX: 31.65 KG/M2 | RESPIRATION RATE: 18 BRPM | HEIGHT: 65 IN | SYSTOLIC BLOOD PRESSURE: 116 MMHG | HEART RATE: 78 BPM | DIASTOLIC BLOOD PRESSURE: 70 MMHG

## 2017-11-20 PROCEDURE — 0503F POSTPARTUM CARE VISIT: CPT | Performed by: ADVANCED PRACTICE MIDWIFE

## 2017-11-20 ASSESSMENT — PATIENT HEALTH QUESTIONNAIRE - PHQ9
1. LITTLE INTEREST OR PLEASURE IN DOING THINGS: 0
SUM OF ALL RESPONSES TO PHQ QUESTIONS 1-9: 0
SUM OF ALL RESPONSES TO PHQ9 QUESTIONS 1 & 2: 0
2. FEELING DOWN, DEPRESSED OR HOPELESS: 0

## 2017-11-20 NOTE — PROGRESS NOTES
Kalinarosanna Fuchs Raji  2017  3:02 PM        Yvette Smith is a 40 y.o. female [id number]      The patient was seen. She has no chief complaints today. She delivered vaginally on 2017. She is not breast feeding and there is not any signs or symptoms of mastitis. The patient completed the E.P.D.S. Evaluation form and scored 3. She does not have any signs or symptoms of post partum depression. She denies any intent to harm others and delusional ideas. Today her lochia is light she denies any dizziness or shortness of breath. Her pregnancy was complicated by:  Patient Active Problem List    Diagnosis Date Noted    Abnormal first trimester screen, elevated free Beta-hCG level 2017     Priority: High     Overview Note:     Fetal echo scheduled.   11/10/14 M APG 8 Wt 7#2 11/10/2014     Priority: High    High Risk Pregnancy 2014     Priority: High     Overview Note:     Rh-/RI/GBS neg      Rhneg/RI/GBSneg 2014     Priority: High     Overview Note:     Rhogam given 17      History of induced  x 1      Priority: Low    History of miscarriage x 2      Priority: Low    H/o Low TSH level in preg 2017     Overview Note:     TSH 0.24 early in pregnancy. 17 labs redrawn and TSH 0.73.       17 F APG 9/9 Wt 7#1     H/O Mild Pancreatitis (Lipase 106) in pregnancy 2017     Overview Note:     Mild Pancreatitis likely secondary to cholithiasis                        -RUQ shows no evidence concerning of obstruction                        -Recommended Low fat diet                        -CMP & Lipase levels to be repeated in 1 wk        Trichomonas in current pregnancy 2017     Overview Note:     Treated w/ 2g Flagyl on 17  JUAN DIEGO needed 17 POSITIVE; treated with 2g Flagyl, will need JUAN DIEGO      Polyhydramnios-RSLVD 2017     Overview Note:     2017 polyhydramnios noted.   Fetal echo completed- no obvious evidence of healthy weight, not smoking or drinking alcoholic beverages, and eating healthy foods. There are tests that all pregnant women are encouraged to take, but there is additional prenatal testing that is regularly offered to any woman 28 or older because of the potential of increased risks to the mother and baby. These tests are chorionic villus sampling (CVS) and amniocentesis. Juan Alberto Rubio is also available which is a non-invasive option for fetal karyotyping. With CVS, a small sample of cells (called chorionic villi) is taken from the placenta where it attached to the wall of the uterus. Chorionic villi are tiny parts of the placenta; therefore they have the same genes as the baby. This test is 98% accurate, but can carry a slightly higher risk of miscarriage than amniocentesis, since the procedure is done in early pregnancy. Amniocentesis is a procedure where a sample of fluid is removed from the amniotic sac for analysis and evaluation. During this procedure, fluid is removed by placing a long needle through the abdominal wall into the amniotic sac. The amniocentesis needle is typically guided into the sac with the help of ultrasound imaging. Once the needle is in the sac, a syringe is used to withdraw the clear mervat-colored amniotic fluid, which looks a bit like urine. NIPT, utilizes the maternal blood to test for fetal cells. These fetal cells are then karyotyped for genetic evaluation. All of these tests, CVS, NIPT and amniocentesis, let couples know if the fetus will have genetic abnormalities, and will help them make informed decisions regarding their pregnancy. Karyotyping by either CVS, NIPT or Amniocentesis is the ONLY way to confirm the genetic chromosomal structure regarding trisomy; Down's Syndrome, Edward's or Pateau's. [facility] was reviewed. If NIPT is positive then a confirmatory amniocentesis would be recommended to confirm the diagnosis.        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 17 labs redrawn and TSH 0.73.       17 F APG 9/9 Wt 7#1     H/O Mild Pancreatitis (Lipase 106) in pregnancy 2017     Mild Pancreatitis likely secondary to cholithiasis                        -RUQ shows no evidence concerning of obstruction                        -Recommended Low fat diet                        -CMP & Lipase levels to be repeated in 1 wk        Trichomonas in current pregnancy 2017     Treated w/ 2g Flagyl on 17  JUAN DIEGO needed 17 POSITIVE; treated with 2g Flagyl, will need JUAN DIEGO      Polyhydramnios-RSLVD 2017 polyhydramnios noted. Fetal echo completed- no obvious evidence of congenital heart defect. Lab slips given for Coxsackie virus A and B, CMV, Parvovirus B19, Toxoplasmosis, etc  Follow up in 4 weeks for fetal growth, BPP, umbilical artery dopplers  2017 JUDD WNL-follow up in 4 weeks with [facility] for ultrasound  11/3/17: JUDD WNL      Abnormal first trimester screen: increased risk of fetal trisomy 21, 1:96 2017     Fetal diploidy for chromosomes 21, 18, 13 confirmed through [facility]. Fetal echo scheduled.  AMA (advanced maternal age) multigravida 35+ 2017 declined invasive testing, opted for noninvasive prenatal testing through [facility]. Advanced Maternal Age Counseling    If a woman is over the age of 28, she is considered to be of Advanced Maternal Age, and with this title comes risks for mom and baby.    Increased risk of Down Syndrome - the most common chromosomal birth defect (chromosomes - cells that carry genes and transmit heredity information)   Increased risk of miscarriage   20% increase through the abdominal wall into the amniotic sac. The amniocentesis needle is typically guided into the sac with the help of ultrasound imaging. Once the needle is in the sac, a syringe is used to withdraw the clear mervat-colored amniotic fluid, which looks a bit like urine. NIPT, utilizes the maternal blood to test for fetal cells. These fetal cells are then karyotyped for genetic evaluation. All of these tests, CVS, NIPT and amniocentesis, let couples know if the fetus will have genetic abnormalities, and will help them make informed decisions regarding their pregnancy. Karyotyping by either CVS, NIPT or Amniocentesis is the ONLY way to confirm the genetic chromosomal structure regarding trisomy; Down's Syndrome, Edward's or Pateau's. [facility] was reviewed. If NIPT is positive then a confirmatory amniocentesis would be recommended to confirm the diagnosis. 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 Return to the office in  3-4 weeks  2. Signs & Symptoms of mastitis reviewed; notify if occurs  3. Secondary smoke risks reviewed. Increased risks of respiratory problems, Sudden     infant death syndrome, and potential malignancies. 4. Abstinence  5. Family planning counseling and STD counseling completed  6. Return in about 4 weeks (around 12/18/2017) for 6 weeks PP check.

## 2017-12-18 ENCOUNTER — OFFICE VISIT (OUTPATIENT)
Dept: OBGYN CLINIC | Age: 37
End: 2017-12-18
Payer: COMMERCIAL

## 2017-12-18 VITALS
HEIGHT: 65 IN | WEIGHT: 193 LBS | SYSTOLIC BLOOD PRESSURE: 116 MMHG | DIASTOLIC BLOOD PRESSURE: 78 MMHG | BODY MASS INDEX: 32.15 KG/M2 | HEART RATE: 80 BPM | RESPIRATION RATE: 16 BRPM

## 2017-12-18 DIAGNOSIS — Z32.02 NEGATIVE PREGNANCY TEST: ICD-10-CM

## 2017-12-18 DIAGNOSIS — Z30.09 FAMILY PLANNING: ICD-10-CM

## 2017-12-18 PROBLEM — O28.9 ABNORMAL FIRST TRIMESTER SCREEN: Status: RESOLVED | Noted: 2017-06-29 | Resolved: 2017-12-18

## 2017-12-18 PROBLEM — O28.9 ABNORMAL FIRST TRIMESTER SCREEN: Status: RESOLVED | Noted: 2017-06-30 | Resolved: 2017-12-18

## 2017-12-18 PROBLEM — O40.9XX0 POLYHYDRAMNIOS, ANTEPARTUM COMPLICATION: Status: RESOLVED | Noted: 2017-07-27 | Resolved: 2017-12-18

## 2017-12-18 PROBLEM — O09.529 AMA (ADVANCED MATERNAL AGE) MULTIGRAVIDA 35+: Status: RESOLVED | Noted: 2017-04-24 | Resolved: 2017-12-18

## 2017-12-18 LAB
CONTROL: NORMAL
PREGNANCY TEST URINE, POC: NEGATIVE

## 2017-12-18 PROCEDURE — 0503F POSTPARTUM CARE VISIT: CPT | Performed by: ADVANCED PRACTICE MIDWIFE

## 2017-12-18 PROCEDURE — 81025 URINE PREGNANCY TEST: CPT | Performed by: ADVANCED PRACTICE MIDWIFE

## 2017-12-18 PROCEDURE — 96372 THER/PROPH/DIAG INJ SC/IM: CPT | Performed by: ADVANCED PRACTICE MIDWIFE

## 2017-12-18 RX ORDER — MEDROXYPROGESTERONE ACETATE 150 MG/ML
150 INJECTION, SUSPENSION INTRAMUSCULAR ONCE
Status: COMPLETED | OUTPATIENT
Start: 2017-12-18 | End: 2017-12-18

## 2017-12-18 RX ADMIN — MEDROXYPROGESTERONE ACETATE 150 MG: 150 INJECTION, SUSPENSION INTRAMUSCULAR at 11:27

## 2017-12-18 ASSESSMENT — PATIENT HEALTH QUESTIONNAIRE - PHQ9
SUM OF ALL RESPONSES TO PHQ QUESTIONS 1-9: 0
2. FEELING DOWN, DEPRESSED OR HOPELESS: 0
SUM OF ALL RESPONSES TO PHQ9 QUESTIONS 1 & 2: 0
1. LITTLE INTEREST OR PLEASURE IN DOING THINGS: 0

## 2018-01-10 PROCEDURE — 3700000025 ANESTHESIA EPIDURAL BLOCK: Performed by: ANESTHESIOLOGY

## 2018-03-01 ENCOUNTER — OFFICE VISIT (OUTPATIENT)
Dept: OBGYN CLINIC | Age: 38
End: 2018-03-01
Payer: COMMERCIAL

## 2018-03-01 VITALS
SYSTOLIC BLOOD PRESSURE: 108 MMHG | BODY MASS INDEX: 31.65 KG/M2 | HEIGHT: 65 IN | WEIGHT: 190 LBS | DIASTOLIC BLOOD PRESSURE: 62 MMHG | HEART RATE: 68 BPM

## 2018-03-01 DIAGNOSIS — N92.6 IRREGULAR MENSES: Primary | ICD-10-CM

## 2018-03-01 PROCEDURE — 99214 OFFICE O/P EST MOD 30 MIN: CPT | Performed by: OBSTETRICS & GYNECOLOGY

## 2018-03-01 PROCEDURE — G8417 CALC BMI ABV UP PARAM F/U: HCPCS | Performed by: OBSTETRICS & GYNECOLOGY

## 2018-03-01 PROCEDURE — G8484 FLU IMMUNIZE NO ADMIN: HCPCS | Performed by: OBSTETRICS & GYNECOLOGY

## 2018-03-01 PROCEDURE — 1036F TOBACCO NON-USER: CPT | Performed by: OBSTETRICS & GYNECOLOGY

## 2018-03-01 PROCEDURE — G8427 DOCREV CUR MEDS BY ELIG CLIN: HCPCS | Performed by: OBSTETRICS & GYNECOLOGY

## 2018-03-01 NOTE — PROGRESS NOTES
calf tenderness, DTR 2/4, and No edema bilaterally    Pelvic: Exam deferred. Diagnostics:  No results found. Lab Results:  Results for orders placed or performed in visit on 17   POCT urine pregnancy   Result Value Ref Range    Preg Test, Ur negative     Control done          Assessment:  1. Irregular menses  CBC Auto Differential    TSH with Reflex    APTT    Protime-INR    US Pelvis Complete    US Non OB Transvaginal     Patient Active Problem List    Diagnosis Date Noted     11/10/14 M APG 8/9 Wt 7#2 11/10/2014     Priority: High    High Risk Pregnancy 2014     Priority: High     Rh-/RI/GBS neg      History of induced  x 1      Priority: Low    History of miscarriage x 2      Priority: Low    H/o Low TSH level in preg 2017     TSH 0.24 early in pregnancy. 17 labs redrawn and TSH 0.73.       18 F APG 9/9 Wt 7#1     H/O Mild Pancreatitis (Lipase 106) in pregnancy 2017     Mild Pancreatitis likely secondary to cholithiasis                        -RUQ shows no evidence concerning of obstruction                        -Recommended Low fat diet                        -CMP & Lipase levels to be repeated in 1 wk        Trichomonas in current pregnancy 2017     Treated w/ 2g Flagyl on 17  JUAN DIEGO needed 17 POSITIVE; treated with 2g Flagyl, will need JUAN DIEGO         Roxana Alegria  No LMP recorded. Patient has had an injection.   History   Smoking Status    Never Smoker   Smokeless Tobacco    Never Used     40 y.o.  [id number]  Obstetric History       T2      L2     SAB2   TAB1   Ectopic0   Molar0   Multiple0   Live Births2       # Outcome Date GA Lbr Joe/2nd Weight Sex Delivery Anes PTL Lv   5 Term 17 38w4d 03:23 / 00:02 7 lb 1 oz (3.204 kg) F Vag-Spont EPI N JAEL      Name: Wang Gilbert:  9                Apgar5: 9   4 Term 11/10/14 40w0d  7 lb 2.6 oz (3.25 kg) M Vag-Spont EPI  JAEL      Apgar1:  8 Apgar5: 9   3 SAB 2005           2 SAB 2000           1 TAB 1998                    The patient is requesting information on family planning. All forms of birth control were reviewed with her. Both male and female reversible and non. She was given written information  And counseled on the need for barrier protection and sexually transmitted disease prevention. The patient chose to proceed with: Bilateral tubal occlusion with endometrial ablation                                                                          The patient did  elect a form of birth control that was PERMANENT AND NON-REVERSIBLE. She was counseled on the failure rates of 18-20/1000 cases annually and the risk of post tubal sterilization syndrome as well as an increase risk of an ectopic pregnancy, a pregnancy in the fallopian tube. If an ectopic pregnancy occurs, the patient was counseled on the possibility of the need for surgery, a blood transfusion or methotrexate management depending on the circumstances at the time of diagnosis. As stated above all types of birth control were reviewed reversible and non. We have recommended to the patient that she undergo a twelve week post procedure Hysterosalpingogram to confirm that her tubes have been occluded bilterally. MEDICAID CONSENT SIGNED TODAY: Yes    PLAN:  Return in about 4 weeks (around 3/29/2018) for Sx board. Pelvic sono with labs  Follow up with general surgery regarding cholecystectomy  Repeat Annual every 1 year  Cervical Cytology Evaluation begins at 24years old. If Negative Cytology, Follow-up screening per current guidelines. Return to the office in 4-6 weeks. Counseled on preventative health maintenance follow-up.   Orders Placed This Encounter   Procedures    US Pelvis Complete     Standing Status:   Future     Standing Expiration Date:   6/1/2018     Order Specific Question:   Reason for exam:     Answer:   irreg bleeding    US Non OB Transvaginal

## 2018-03-06 ENCOUNTER — HOSPITAL ENCOUNTER (OUTPATIENT)
Age: 38
Discharge: HOME | End: 2018-03-06
Payer: COMMERCIAL

## 2018-03-06 DIAGNOSIS — N92.6 IRREGULAR MENSES: ICD-10-CM

## 2018-03-06 LAB
ABSOLUTE EOS #: 0 K/UL (ref 0–0.4)
ABSOLUTE IMMATURE GRANULOCYTE: NORMAL K/UL (ref 0–0.3)
ABSOLUTE LYMPH #: 1.4 K/UL (ref 1–4.8)
ABSOLUTE MONO #: 0.3 K/UL (ref 0.1–1.3)
ALBUMIN SERPL-MCNC: 4.2 G/DL (ref 3.5–5.2)
ALBUMIN/GLOBULIN RATIO: ABNORMAL (ref 1–2.5)
ALP BLD-CCNC: 76 U/L (ref 35–104)
ALT SERPL-CCNC: 32 U/L (ref 5–33)
AMYLASE: 72 U/L (ref 28–100)
AST SERPL-CCNC: 22 U/L
BASOPHILS # BLD: 1 % (ref 0–2)
BASOPHILS ABSOLUTE: 0 K/UL (ref 0–0.2)
BILIRUB SERPL-MCNC: 0.24 MG/DL (ref 0.3–1.2)
BILIRUBIN DIRECT: 0.08 MG/DL
BILIRUBIN, INDIRECT: 0.16 MG/DL (ref 0–1)
DIFFERENTIAL TYPE: NORMAL
EOSINOPHILS RELATIVE PERCENT: 1 % (ref 0–4)
GLOBULIN: ABNORMAL G/DL (ref 1.5–3.8)
HCT VFR BLD CALC: 40.9 % (ref 36–46)
HEMOGLOBIN: 13.9 G/DL (ref 12–16)
IMMATURE GRANULOCYTES: NORMAL %
INR BLD: 1
LIPASE: 64 U/L (ref 13–60)
LYMPHOCYTES # BLD: 32 % (ref 24–44)
MCH RBC QN AUTO: 29.4 PG (ref 26–34)
MCHC RBC AUTO-ENTMCNC: 33.9 G/DL (ref 31–37)
MCV RBC AUTO: 86.9 FL (ref 80–100)
MONOCYTES # BLD: 7 % (ref 1–7)
NRBC AUTOMATED: NORMAL PER 100 WBC
PARTIAL THROMBOPLASTIN TIME: 27.9 SEC (ref 23–31)
PDW BLD-RTO: 13.1 % (ref 11.5–14.9)
PLATELET # BLD: 219 K/UL (ref 150–450)
PLATELET ESTIMATE: NORMAL
PMV BLD AUTO: 7.9 FL (ref 6–12)
PROTHROMBIN TIME: 10.7 SEC (ref 9.7–12)
RBC # BLD: 4.71 M/UL (ref 4–5.2)
RBC # BLD: NORMAL 10*6/UL
SEG NEUTROPHILS: 59 % (ref 36–66)
SEGMENTED NEUTROPHILS ABSOLUTE COUNT: 2.7 K/UL (ref 1.3–9.1)
TOTAL PROTEIN: 7.1 G/DL (ref 6.4–8.3)
TSH SERPL DL<=0.05 MIU/L-ACNC: 0.97 MIU/L (ref 0.3–5)
WBC # BLD: 4.5 K/UL (ref 3.5–11)
WBC # BLD: NORMAL 10*3/UL

## 2018-03-06 PROCEDURE — 85025 COMPLETE CBC W/AUTO DIFF WBC: CPT

## 2018-03-06 PROCEDURE — 83690 ASSAY OF LIPASE: CPT

## 2018-03-06 PROCEDURE — 85610 PROTHROMBIN TIME: CPT

## 2018-03-06 PROCEDURE — 85730 THROMBOPLASTIN TIME PARTIAL: CPT

## 2018-03-06 PROCEDURE — 82150 ASSAY OF AMYLASE: CPT

## 2018-03-06 PROCEDURE — 36415 COLL VENOUS BLD VENIPUNCTURE: CPT

## 2018-03-06 PROCEDURE — 84443 ASSAY THYROID STIM HORMONE: CPT

## 2018-03-06 PROCEDURE — 80076 HEPATIC FUNCTION PANEL: CPT

## 2018-03-14 ENCOUNTER — OFFICE VISIT (OUTPATIENT)
Dept: OBGYN CLINIC | Age: 38
End: 2018-03-14
Payer: COMMERCIAL

## 2018-03-14 ENCOUNTER — HOSPITAL ENCOUNTER (OUTPATIENT)
Age: 38
Setting detail: SPECIMEN
Discharge: HOME | End: 2018-03-14
Payer: COMMERCIAL

## 2018-03-14 VITALS
DIASTOLIC BLOOD PRESSURE: 68 MMHG | RESPIRATION RATE: 18 BRPM | BODY MASS INDEX: 32.32 KG/M2 | WEIGHT: 194 LBS | SYSTOLIC BLOOD PRESSURE: 114 MMHG | HEART RATE: 82 BPM | HEIGHT: 65 IN

## 2018-03-14 DIAGNOSIS — N94.6 DYSMENORRHEA: ICD-10-CM

## 2018-03-14 DIAGNOSIS — Z11.51 SPECIAL SCREENING EXAMINATION FOR HUMAN PAPILLOMAVIRUS (HPV): ICD-10-CM

## 2018-03-14 DIAGNOSIS — Z01.419 WELL FEMALE EXAM WITH ROUTINE GYNECOLOGICAL EXAM: Primary | ICD-10-CM

## 2018-03-14 LAB
CONTROL: NORMAL
PREGNANCY TEST URINE, POC: NEGATIVE

## 2018-03-14 PROCEDURE — 99395 PREV VISIT EST AGE 18-39: CPT | Performed by: ADVANCED PRACTICE MIDWIFE

## 2018-03-14 PROCEDURE — 87624 HPV HI-RISK TYP POOLED RSLT: CPT

## 2018-03-14 PROCEDURE — G0145 SCR C/V CYTO,THINLAYER,RESCR: HCPCS

## 2018-03-14 PROCEDURE — 81025 URINE PREGNANCY TEST: CPT | Performed by: ADVANCED PRACTICE MIDWIFE

## 2018-03-14 PROCEDURE — 96372 THER/PROPH/DIAG INJ SC/IM: CPT | Performed by: ADVANCED PRACTICE MIDWIFE

## 2018-03-14 RX ORDER — MEDROXYPROGESTERONE ACETATE 150 MG/ML
150 INJECTION, SUSPENSION INTRAMUSCULAR ONCE
Status: COMPLETED | OUTPATIENT
Start: 2018-03-14 | End: 2018-03-14

## 2018-03-14 RX ADMIN — MEDROXYPROGESTERONE ACETATE 150 MG: 150 INJECTION, SUSPENSION INTRAMUSCULAR at 12:46

## 2018-03-14 ASSESSMENT — PATIENT HEALTH QUESTIONNAIRE - PHQ9
SUM OF ALL RESPONSES TO PHQ QUESTIONS 1-9: 0
SUM OF ALL RESPONSES TO PHQ9 QUESTIONS 1 & 2: 0
2. FEELING DOWN, DEPRESSED OR HOPELESS: 0
1. LITTLE INTEREST OR PLEASURE IN DOING THINGS: 0

## 2018-03-14 NOTE — PROGRESS NOTES
History and Physical  [address], [address] [address], [address] [facility] [phone]   Fax [phone]  [address] [facility]  3/14/2018              40 y.o. Chief Complaint   Patient presents with    Annual Exam       No LMP recorded. Patient has had an injection. Primary Care Physician: Davidson Abdalla DO    The patient was seen and examined. She has no chief complaint today and is here for her annual exam.  Her bowels are regular. There are no voiding complaints. She denies any bloating. She denies vaginal discharge and was counseled on STD's and the need for barrier contraception. HPI : [id number] [facility] is a 40 y.o. female [id number]    Gyn exam, desires to have depo injection again.  Wants to have TL this year will call to return to office to discuss this with physician again if necessary  ________________________________________________________________________  Obstetric History       T2      L2     SAB2   TAB1   Ectopic0   Molar0   Multiple0   Live Births2       # Outcome Date GA Lbr Joe/2nd Weight Sex Delivery Anes PTL Lv   5 Term 17 38w4d 03:23 / 00:02 7 lb 1 oz (3.204 kg) F Vag-Spont EPI N JAEL      Name: Wanda Ramal:  10                Apgar5: 9   4 Term 11/10/14 40w0d  7 lb 2.6 oz (3.25 kg) M Vag-Spont EPI  JAEL      Apgar1:  8                Apgar5: 9   3 SAB            2 SAB            1 TAB 1998                Past Medical History:   Diagnosis Date    History of induced      History of miscarriage     x2    HPV in female     Low grade squamous intraepithelial lesion (LGSIL) on cervical Pap smear     Rh negative state in antepartum period 2014                                                                   Past Surgical History:   Procedure Laterality Date    DILATION AND CURETTAGE  , 2005    after sab    LASIK      WISDOM TOOTH EXTRACTION       Family History   Problem Relation Age of Onset    Lung Cancer Paternal Grandfather     Diabetes Other     Stroke Other     Lung Cancer Other     Lung Cancer Other     Breast Cancer Neg Hx     Cancer Neg Hx     Colon Cancer Neg Hx     Eclampsia Neg Hx     Hypertension Neg Hx     Ovarian Cancer Neg Hx      Labor Neg Hx     Spont Abortions Neg Hx      Social History     Social History    Marital status:      Spouse name: N/A    Number of children: N/A    Years of education: N/A     Occupational History    Not on file. Social History Main Topics    Smoking status: Never Smoker    Smokeless tobacco: Never Used    Alcohol use No    Drug use: No    Sexual activity: Yes     Partners: Male     Other Topics Concern    Not on file     Social History Narrative    No narrative on file       MEDICATIONS:  Current Outpatient Prescriptions   Medication Sig Dispense Refill    Prenatal Multivit-Min-Fe-FA (PRENATAL VITAMINS) 0.8 MG TABS Take 1 tablet by mouth daily 30 tablet 12     No current facility-administered medications for this visit. ALLERGIES:  Allergies as of 2018    (No Known Allergies)       Symptoms of decreased mood absent    **If either question is answered in a  positive fashion then complete the PHQ9 Scoring Evaluation and make the appropriate referral**      Immunization status: up to date and documented, stated as current, but no records available. Gynecologic History:  Menarche: 16 yo  Menopause at  yo     No LMP recorded. Patient has had an injection. Sexually Active: Yes    STD History: No     Permanent Sterilization: No   Reversible Birth Control: Depo Provera        Hormone Replacement Exposure: No      Genetic Qualified Family History of Breast, Ovarian , Colon or Uterine Cancer: See family history     If YES see scanned worksheet.     Preventative Health Testing:    Health Maintenance:  Health Maintenance Due   Topic Date Due    Cervical cancer screen  2018

## 2018-03-15 LAB
HPV SAMPLE: ABNORMAL
HPV SOURCE: ABNORMAL
HPV, GENOTYPE 16: NOT DETECTED
HPV, GENOTYPE 18: NOT DETECTED
HPV, HIGH RISK OTHER: DETECTED
HPV, INTERPRETATION: ABNORMAL

## 2018-03-19 ENCOUNTER — HOSPITAL ENCOUNTER (OUTPATIENT)
Dept: NUCLEAR MEDICINE | Age: 38
Discharge: HOME | End: 2018-03-21
Payer: COMMERCIAL

## 2018-03-19 DIAGNOSIS — R10.11 RUQ ABDOMINAL PAIN: ICD-10-CM

## 2018-03-19 PROCEDURE — 6360000002 HC RX W HCPCS: Performed by: RADIOLOGY

## 2018-03-19 PROCEDURE — 2580000003 HC RX 258: Performed by: RADIOLOGY

## 2018-03-19 PROCEDURE — 3430000000 HC RX DIAGNOSTIC RADIOPHARMACEUTICAL: Performed by: SURGERY

## 2018-03-19 PROCEDURE — 78227 HEPATOBIL SYST IMAGE W/DRUG: CPT

## 2018-03-19 PROCEDURE — A9537 TC99M MEBROFENIN: HCPCS | Performed by: SURGERY

## 2018-03-19 PROCEDURE — 2580000003 HC RX 258: Performed by: SURGERY

## 2018-03-19 RX ORDER — MORPHINE SULFATE 4 MG/ML
3.5 INJECTION, SOLUTION INTRAMUSCULAR; INTRAVENOUS ONCE
Status: COMPLETED | OUTPATIENT
Start: 2018-03-19 | End: 2018-03-19

## 2018-03-19 RX ORDER — SODIUM CHLORIDE 0.9 % (FLUSH) 0.9 %
10 SYRINGE (ML) INJECTION PRN
Status: DISCONTINUED | OUTPATIENT
Start: 2018-03-19 | End: 2018-03-22 | Stop reason: HOSPADM

## 2018-03-19 RX ORDER — SODIUM CHLORIDE 0.9 % (FLUSH) 0.9 %
10 SYRINGE (ML) INJECTION ONCE
Status: COMPLETED | OUTPATIENT
Start: 2018-03-19 | End: 2018-03-19

## 2018-03-19 RX ADMIN — MORPHINE SULFATE 3.5 MG: 4 INJECTION, SOLUTION INTRAMUSCULAR; INTRAVENOUS at 10:24

## 2018-03-19 RX ADMIN — Medication 10 ML: at 10:30

## 2018-03-19 RX ADMIN — Medication 10 ML: at 10:25

## 2018-03-19 RX ADMIN — Medication 1.7 MILLICURIE: at 10:30

## 2018-03-19 RX ADMIN — Medication 3.4 MILLICURIE: at 09:00

## 2018-03-19 RX ADMIN — Medication 10 ML: at 09:00

## 2018-03-19 ASSESSMENT — PAIN SCALES - GENERAL: PAINLEVEL_OUTOF10: 0

## 2018-03-23 LAB — CYTOLOGY REPORT: NORMAL

## 2018-03-26 ENCOUNTER — TELEPHONE (OUTPATIENT)
Dept: OBGYN CLINIC | Age: 38
End: 2018-03-26

## 2018-05-11 ENCOUNTER — HOSPITAL ENCOUNTER (OUTPATIENT)
Age: 38
Setting detail: SPECIMEN
Discharge: HOME | End: 2018-05-11
Payer: COMMERCIAL

## 2018-05-11 ENCOUNTER — PROCEDURE VISIT (OUTPATIENT)
Dept: OBGYN CLINIC | Age: 38
End: 2018-05-11
Payer: COMMERCIAL

## 2018-05-11 VITALS
HEART RATE: 76 BPM | DIASTOLIC BLOOD PRESSURE: 80 MMHG | WEIGHT: 201 LBS | SYSTOLIC BLOOD PRESSURE: 118 MMHG | BODY MASS INDEX: 33.49 KG/M2 | HEIGHT: 65 IN

## 2018-05-11 DIAGNOSIS — Z32.02 NEGATIVE PREGNANCY TEST: ICD-10-CM

## 2018-05-11 DIAGNOSIS — R87.613 HSIL (HIGH GRADE SQUAMOUS INTRAEPITHELIAL LESION) ON PAP SMEAR OF CERVIX: Primary | ICD-10-CM

## 2018-05-11 DIAGNOSIS — Z30.09 FAMILY PLANNING EDUCATION, GUIDANCE, AND COUNSELING: ICD-10-CM

## 2018-05-11 DIAGNOSIS — R87.810 CERVICAL HIGH RISK HPV (HUMAN PAPILLOMAVIRUS) TEST POSITIVE: ICD-10-CM

## 2018-05-11 LAB
CONTROL: NORMAL
PREGNANCY TEST URINE, POC: NEGATIVE

## 2018-05-11 PROCEDURE — 88342 IMHCHEM/IMCYTCHM 1ST ANTB: CPT

## 2018-05-11 PROCEDURE — 81025 URINE PREGNANCY TEST: CPT | Performed by: OBSTETRICS & GYNECOLOGY

## 2018-05-11 PROCEDURE — 57454 BX/CURETT OF CERVIX W/SCOPE: CPT | Performed by: OBSTETRICS & GYNECOLOGY

## 2018-05-11 PROCEDURE — 88305 TISSUE EXAM BY PATHOLOGIST: CPT

## 2018-05-15 LAB — SURGICAL PATHOLOGY REPORT: NORMAL

## 2018-05-16 ENCOUNTER — TELEPHONE (OUTPATIENT)
Dept: OBGYN CLINIC | Age: 38
End: 2018-05-16

## 2018-05-22 ENCOUNTER — OFFICE VISIT (OUTPATIENT)
Dept: OBGYN CLINIC | Age: 38
End: 2018-05-22
Payer: COMMERCIAL

## 2018-05-22 VITALS
BODY MASS INDEX: 33.49 KG/M2 | HEART RATE: 68 BPM | HEIGHT: 65 IN | DIASTOLIC BLOOD PRESSURE: 70 MMHG | WEIGHT: 201 LBS | SYSTOLIC BLOOD PRESSURE: 110 MMHG

## 2018-05-22 DIAGNOSIS — Z30.09 FAMILY PLANNING EDUCATION, GUIDANCE, AND COUNSELING: ICD-10-CM

## 2018-05-22 DIAGNOSIS — R87.613 HSIL (HIGH GRADE SQUAMOUS INTRAEPITHELIAL LESION) ON PAP SMEAR OF CERVIX: Primary | ICD-10-CM

## 2018-05-22 DIAGNOSIS — N92.6 IRREGULAR MENSES: ICD-10-CM

## 2018-05-22 PROCEDURE — 1036F TOBACCO NON-USER: CPT | Performed by: OBSTETRICS & GYNECOLOGY

## 2018-05-22 PROCEDURE — G8427 DOCREV CUR MEDS BY ELIG CLIN: HCPCS | Performed by: OBSTETRICS & GYNECOLOGY

## 2018-05-22 PROCEDURE — 99213 OFFICE O/P EST LOW 20 MIN: CPT | Performed by: OBSTETRICS & GYNECOLOGY

## 2018-05-22 PROCEDURE — G8417 CALC BMI ABV UP PARAM F/U: HCPCS | Performed by: OBSTETRICS & GYNECOLOGY

## 2018-06-19 ENCOUNTER — OFFICE VISIT (OUTPATIENT)
Dept: OBGYN CLINIC | Age: 38
End: 2018-06-19

## 2018-06-19 VITALS
WEIGHT: 201 LBS | BODY MASS INDEX: 33.49 KG/M2 | HEART RATE: 68 BPM | HEIGHT: 65 IN | DIASTOLIC BLOOD PRESSURE: 68 MMHG | SYSTOLIC BLOOD PRESSURE: 108 MMHG

## 2018-06-19 DIAGNOSIS — N87.9 CERVICAL DYSPLASIA: ICD-10-CM

## 2018-06-19 DIAGNOSIS — Z98.51 S/P TUBAL LIGATION: ICD-10-CM

## 2018-06-19 DIAGNOSIS — Z09 POSTOP CHECK: Primary | ICD-10-CM

## 2018-06-19 PROCEDURE — 99024 POSTOP FOLLOW-UP VISIT: CPT | Performed by: OBSTETRICS & GYNECOLOGY

## 2019-05-02 ENCOUNTER — HOSPITAL ENCOUNTER (OUTPATIENT)
Age: 39
Setting detail: SPECIMEN
Discharge: HOME | End: 2019-05-02
Payer: COMMERCIAL

## 2019-05-02 ENCOUNTER — OFFICE VISIT (OUTPATIENT)
Dept: OBGYN CLINIC | Age: 39
End: 2019-05-02
Payer: COMMERCIAL

## 2019-05-02 VITALS
HEIGHT: 65 IN | BODY MASS INDEX: 32.49 KG/M2 | SYSTOLIC BLOOD PRESSURE: 110 MMHG | DIASTOLIC BLOOD PRESSURE: 68 MMHG | WEIGHT: 195 LBS

## 2019-05-02 DIAGNOSIS — Z01.419 WELL FEMALE EXAM WITH ROUTINE GYNECOLOGICAL EXAM: Primary | ICD-10-CM

## 2019-05-02 DIAGNOSIS — Z01.419 WELL FEMALE EXAM WITH ROUTINE GYNECOLOGICAL EXAM: ICD-10-CM

## 2019-05-02 DIAGNOSIS — Z11.51 SPECIAL SCREENING EXAMINATION FOR HUMAN PAPILLOMAVIRUS (HPV): ICD-10-CM

## 2019-05-02 PROCEDURE — 99395 PREV VISIT EST AGE 18-39: CPT | Performed by: ADVANCED PRACTICE MIDWIFE

## 2019-05-02 PROCEDURE — 87624 HPV HI-RISK TYP POOLED RSLT: CPT

## 2019-05-02 PROCEDURE — G0145 SCR C/V CYTO,THINLAYER,RESCR: HCPCS

## 2019-05-02 ASSESSMENT — PATIENT HEALTH QUESTIONNAIRE - PHQ9
SUM OF ALL RESPONSES TO PHQ9 QUESTIONS 1 & 2: 0
SUM OF ALL RESPONSES TO PHQ QUESTIONS 1-9: 0
2. FEELING DOWN, DEPRESSED OR HOPELESS: 0
SUM OF ALL RESPONSES TO PHQ QUESTIONS 1-9: 0
1. LITTLE INTEREST OR PLEASURE IN DOING THINGS: 0

## 2019-05-02 NOTE — PROGRESS NOTES
History and Physical  [address], [address] [facility] [address] [address] [facility] [phone]   Fax [phone]  [address] [facility]  2019              41 y.o. Chief Complaint   Patient presents with    Annual Exam       Patient's last menstrual period was 2019. Primary Care Physician: Gracie Boogie DO    The patient was seen and examined. She has no chief complaint today and is here for her annual exam.  Her bowels are regular. There are no voiding complaints. She denies any bloating. She denies vaginal discharge and was counseled on STD's and the need for barrier contraception. HPI : [id number] [facility] is a 41 y.o. female [id number]    GYN exam no complaints, H/O of LEEP 2018 + SHANIQUA 2 & 3  ________________________________________________________________________  OB History    Para Term  AB Living   5 2 2 0 3 2   SAB TAB Ectopic Molar Multiple Live Births   2 1 0 0 0 2      # Outcome Date GA Lbr Joe/2nd Weight Sex Delivery Anes PTL Lv   5 Term 17 38w4d 03:23 / 00:02 7 lb 1 oz (3.204 kg) F Vag-Spont EPI N JAEL      Name: Caroline Rend: 10  Apgar5: 9   4 Term 11/10/14 40w0d  7 lb 2.6 oz (3.25 kg) M Vag-Spont EPI  JAEL      Birth Comments: 14 1829    Education information given to mother and she verbalizes understanding about the following:      Infant security. Patient safety. Skin to Skin Contact for You and Your Baby. Hour for family beginnings. Benefits of breastfeeding.           Apgar1: 8  Apgar5: 9   3 SAB            2 SAB            1 TAB 1998             Past Medical History:   Diagnosis Date    History of induced      History of miscarriage     x2    HPV in female     Low grade squamous intraepithelial lesion (LGSIL) on cervical Pap smear     Rh negative state in antepartum period 2014                                                                   Past Surgical History: Procedure Laterality Date    CHOLECYSTECTOMY, LAPAROSCOPIC  2018    Dr Dhillon Linear CURETTAGE  ,     after sab    LASIK      LEEP  2018    [facility]  2018    [facility]    WISDOM TOOTH EXTRACTION       Family History   Problem Relation Age of Onset    Lung Cancer Paternal Grandfather     Diabetes Other     Stroke Other     Lung Cancer Other     Lung Cancer Other     Breast Cancer Neg Hx     Cancer Neg Hx     Colon Cancer Neg Hx     Eclampsia Neg Hx     Hypertension Neg Hx     Ovarian Cancer Neg Hx      Labor Neg Hx     Spont Abortions Neg Hx      Social History     Socioeconomic History    Marital status:      Spouse name: Not on file    Number of children: Not on file    Years of education: Not on file    Highest education level: Not on file   Occupational History    Not on file   Social Needs    Financial resource strain: Not on file    Food insecurity:     Worry: Not on file     Inability: Not on file    Transportation needs:     Medical: Not on file     Non-medical: Not on file   Tobacco Use    Smoking status: Never Smoker    Smokeless tobacco: Never Used   Substance and Sexual Activity    Alcohol use: No    Drug use: No    Sexual activity: Yes     Partners: Male   Lifestyle    Physical activity:     Days per week: Not on file     Minutes per session: Not on file    Stress: Not on file   Relationships    Social connections:     Talks on phone: Not on file     Gets together: Not on file     Attends Spiritism service: Not on file     Active member of club or organization: Not on file     Attends meetings of clubs or organizations: Not on file     Relationship status: Not on file    Intimate partner violence:     Fear of current or ex partner: Not on file     Emotionally abused: Not on file     Physically abused: Not on file     Forced sexual activity: Not on file   Other Topics Concern    Not on file   Social History Narrative  Not on file       MEDICATIONS:  Current Outpatient Medications   Medication Sig Dispense Refill    Prenatal Multivit-Min-Fe-FA (PRENATAL VITAMINS) 0.8 MG TABS Take 1 tablet by mouth daily 30 tablet 12     No current facility-administered medications for this visit. ALLERGIES:  Allergies as of 05/02/2019    (No Known Allergies)       Symptoms of decreased mood absent    **If either question is answered in a  positive fashion then complete the PHQ9 Scoring Evaluation and make the appropriate referral**      Immunization status: up to date and documented, stated as current, but no records available. Gynecologic History:  Menarche: 16 yo  Menopause at  yo     Patient's last menstrual period was 04/21/2019. Sexually Active: Yes    STD History: No     Permanent Sterilization: No   Reversible Birth Control: Depo Provera        Hormone Replacement Exposure: No      Genetic Qualified Family History of Breast, Ovarian , Colon or Uterine Cancer: See family history     If YES see scanned worksheet. Preventative Health Testing:    Health Maintenance:  Health Maintenance Due   Topic Date Due    Varicella Vaccine (1 of 2 - 13+ 2-dose series) 06/30/1994    Cervical cancer screen  03/14/2019       Date of Last Pap Smear: 2/2017 LSIL/positive HPV  Abnormal Pap Smear History: 3/2018 HSIL/ positive HPV  Colposcopy History: Yes SHANIQUA 2 & 3 Had LEEP 6/2018  Date of Last Mammogram: n/a  Date of Last Colonoscopy:   Date of Last Bone Density:      ________________________________________________________________________     Please insert coding required NEW ROS/Vitals/PE portion and delete originating  information if this note is not your own!!    REVIEW OF SYSTEMS:    yes   A minimum of an eleven point review of systems was completed. Review Of Systems (11 point):  Constitutional: No fever, chills or malaise;  No weight change or fatigue  Head and Eyes: No vision, Headache, Dizziness or trauma in last 12 months  ENT ROS: No hearing, Tinnitis, sinus or taste problems  Hematological and Lymphatic ROS:No Lymphoma, Von Willebrand's, Hemophillia or Bleeding History  Psych ROS: No Depression, Homicidal thoughts,suicidal thoughts, or anxiety  Breast ROS: No prior breast abnormalities or lumps  Respiratory ROS: No SOB, Pneumoniae,Cough, or Pulmonary Embolism History  Cardiovascular ROS: No Chest Pain with Exertion, Palpitations, Syncope, Edema, Arrhythmia  Gastrointestinal ROS: No Indigestion, Heartburn, Nausea, vomiting, Diarrhea, Constipation,or Bowel Changes; No Bloody Stools or melena  Genito-Urinary ROS: No Dysuria, Hematuria or Nocturia. No Urinary Incontinence or Vaginal Discharge  Musculoskeletal ROS: No Arthralgia, Arthritis,Gout,Osteoporosis or Rheumatism  Neurological ROS: No CVA, Migraines, Epilepsy, Seizure Hx, or Limb Weakness  Dermatological ROS: No Rash, Itching, Hives, Mole Changes or Cancer                                                                                                                                                                                                                                  PHYSICAL Exam:     Constitutional:  Vitals:    05/02/19 1501   BP: 110/68   Site: Right Upper Arm   Position: Sitting   Cuff Size: Medium Adult   Weight: 195 lb (88.5 kg)   Height: 5' 5\" (1.651 m)         General Appearance: This  is a well Developed, well Nourished, well groomed female. Her BMI was reviewed. Nutritional decision making was discussed. Skin:  There was a Normal Inspection of the skin without rashes or lesions. There were no rashes. (Papular, Maculopapular, Hives, Pustular, Macular)     There were no lesions (Ulcers, Erythema, Abn. Appearing Nevi)            Lymphatic:  No Lymph Nodes were Palpable in the neck , axilla or groin. # Of Lymph Nodes; Location ; Character [Normal]  [Shotty] [Tender] [Enlarged]     Neck and EENT:  The neck was supple.  There were no masses   The screening examination for human papillomavirus (HPV)  PAP SMEAR          Chief Complaint   Patient presents with    Annual Exam          Past Medical History:   Diagnosis Date    History of induced      History of miscarriage     x2    HPV in female     Low grade squamous intraepithelial lesion (LGSIL) on cervical Pap smear     Rh negative state in antepartum period 2014         Patient Active Problem List    Diagnosis Date Noted     11/10/14 M APG 8/9 Wt 7#2 11/10/2014     Priority: High    High Risk Pregnancy 2014     Priority: High     Rh-/RI/GBS neg      History of induced  x 1      Priority: Low    History of miscarriage x 2      Priority: Low    H/o Low TSH level in preg 2017     TSH 0.24 early in pregnancy. 17 labs redrawn and TSH 0.73.       17 F APG 9/9 Wt 7#1     H/O Mild Pancreatitis (Lipase 106) in pregnancy 2017     Mild Pancreatitis likely secondary to cholithiasis                        -RUQ shows no evidence concerning of obstruction                        -Recommended Low fat diet                        -CMP & Lipase levels to be repeated in 1 wk        Trichomonas in current pregnancy 2017     Treated w/ 2g Flagyl on 17  JUAN DIEGO needed 17 POSITIVE; treated with 2g Flagyl, will need JUAN DIEGO            Hereditary Breast, Ovarian, Colon and Uterine Cancer screening Done. Tobacco & Secondary smoke risks reviewed; instructed on cessation and avoidance      Counseling Completed:  Preventative Health Recommendations and Follow up. The patient was informed of the recommended preventative health recommendations. 1. Annuals every year; Cytology collections per prevailing guidelines. 2. Mammograms begin every year at 35 yo if no abnormalities are found and no family     History. 3. Bone density studies every 2-3 years. Begin at 71 yo. If no fracture history or osteoporosis family history. (significant).   4. Colonoscopy begin at 49 yo. Repeat every ten years if negative and no family history. 5. Calcium of 9291-7675 mg/day in split dosing  6. Vitamin D 400-800 IU/day  7. All other preventative health recommendations will be managed by the patients Primary care physician. PLAN:  PAP collected call for results  Repeat Annual every 1 year  Cervical Cytology Evaluation begins at 24years old. If Negative Cytology, Follow-up screening per current guidelines. Mammograms every 1 year. If 37 yo and last mammogram was negative. Calcium and Vitamin D dosing reviewed. Colonoscopy screening reviewed as well as onset for bone density testing. Birth control and barrier recommendations discussed. STD counseling and prevention reviewed. Gardisil counseling completed for all patients 7-35 yo. Routine health maintenance per patients PCP. Orders Placed This Encounter   Procedures    PAP SMEAR     Patient History:    No LMP recorded. Patient has had an injection. OBGYN Status: Injection  Past Surgical History:  06/13/2018: CHOLECYSTECTOMY, LAPAROSCOPIC      Comment:  Dr Armond Kraus  2000, 2005: DILATION AND CURETTAGE      Comment:  after sab  No date: LASIK  06/2018: LEEP  06/13/2018: TUBAL LIGATION      Comment:  Ana Paula Majano 59  No date: WISDOM TOOTH EXTRACTION      Social History    Tobacco Use      Smoking status: Never Smoker      Smokeless tobacco: Never Used       Standing Status:   Future     Standing Expiration Date:   5/1/2020     Order Specific Question:   Collection Type     Answer: Thin Prep     Order Specific Question:   Prior Abnormal Pap Test     Answer:   No     Order Specific Question:   Screening or Diagnostic     Answer:   Screening     Order Specific Question:   HPV Requested?      Answer:   Yes     Order Specific Question:   High Risk Patient     Answer:   N/A

## 2019-05-03 LAB
HPV SAMPLE: NORMAL
HPV, GENOTYPE 16: NOT DETECTED
HPV, GENOTYPE 18: NOT DETECTED
HPV, HIGH RISK OTHER: NOT DETECTED
HPV, INTERPRETATION: NORMAL
SPECIMEN DESCRIPTION: NORMAL

## 2019-05-08 LAB — CYTOLOGY REPORT: NORMAL

## 2019-05-09 ENCOUNTER — TELEPHONE (OUTPATIENT)
Dept: OBGYN CLINIC | Age: 39
End: 2019-05-09

## 2019-05-09 RX ORDER — FLUCONAZOLE 150 MG/1
150 TABLET ORAL ONCE
Qty: 2 TABLET | Refills: 0 | Status: SHIPPED | OUTPATIENT
Start: 2019-05-09 | End: 2019-05-09

## 2019-05-09 NOTE — TELEPHONE ENCOUNTER
----- Message from SAMANTHA Martinez CNP sent at 5/8/2019  5:19 PM EDT -----  Pap smear with ASCUS  Neg HPV  AGE 38  +candida  Diflucan 150mg PO X 1 now and repeat in 7 days. Repeat pap smear in 1 year. Please let patient know recommendations.

## 2019-05-09 NOTE — TELEPHONE ENCOUNTER
Spoke with patient as requested by Robby LAWSON. Patient informed of negative HPV, Pap smear with ascus, and positive candida. Patient verbalized an understanding of the information provided. Per Robby LAWSON, Diflucan 150mg once PO to be repeated in 7 days ordered. Patient will follow up with office in one year for yearly pap smear or unless otherwise indicated.

## 2019-11-20 ENCOUNTER — HOSPITAL ENCOUNTER (OUTPATIENT)
Age: 39
Discharge: HOME | End: 2019-11-20
Payer: COMMERCIAL

## 2019-11-20 LAB — RUBV IGG SER QL: 187.7 IU/ML

## 2019-11-20 PROCEDURE — 36415 COLL VENOUS BLD VENIPUNCTURE: CPT

## 2019-11-20 PROCEDURE — 86762 RUBELLA ANTIBODY: CPT

## 2019-11-20 PROCEDURE — 86481 TB AG RESPONSE T-CELL SUSP: CPT

## 2019-11-20 PROCEDURE — 86787 VARICELLA-ZOSTER ANTIBODY: CPT

## 2019-11-20 PROCEDURE — 86735 MUMPS ANTIBODY: CPT

## 2019-11-20 PROCEDURE — 86765 RUBEOLA ANTIBODY: CPT

## 2019-11-22 LAB
MEASLES IMMUNE (IGG): 5.29
MUV IGG SER QL: 2
VZV IGG SER QL IA: 3.32

## 2019-11-25 LAB — T-SPOT TB TEST: NORMAL

## 2020-02-10 PROBLEM — A59.01 TRICHOMONAS VAGINITIS: Status: RESOLVED | Noted: 2017-07-29 | Resolved: 2020-02-10

## 2020-03-22 ENCOUNTER — HOSPITAL ENCOUNTER (EMERGENCY)
Age: 40
Discharge: HOME | End: 2020-03-22
Attending: EMERGENCY MEDICINE
Payer: MEDICARE

## 2020-03-22 ENCOUNTER — NURSE TRIAGE (OUTPATIENT)
Dept: OTHER | Facility: CLINIC | Age: 40
End: 2020-03-22

## 2020-03-22 ENCOUNTER — APPOINTMENT (OUTPATIENT)
Dept: GENERAL RADIOLOGY | Age: 40
End: 2020-03-22
Payer: MEDICARE

## 2020-03-22 VITALS
DIASTOLIC BLOOD PRESSURE: 64 MMHG | WEIGHT: 180 LBS | HEIGHT: 65 IN | SYSTOLIC BLOOD PRESSURE: 126 MMHG | OXYGEN SATURATION: 100 % | TEMPERATURE: 98.6 F | HEART RATE: 76 BPM | RESPIRATION RATE: 18 BRPM | BODY MASS INDEX: 29.99 KG/M2

## 2020-03-22 LAB
DIRECT EXAM: NORMAL
Lab: NORMAL
SPECIMEN DESCRIPTION: NORMAL

## 2020-03-22 PROCEDURE — 71045 X-RAY EXAM CHEST 1 VIEW: CPT

## 2020-03-22 PROCEDURE — 99285 EMERGENCY DEPT VISIT HI MDM: CPT

## 2020-03-22 PROCEDURE — 87804 INFLUENZA ASSAY W/OPTIC: CPT

## 2020-03-22 ASSESSMENT — ENCOUNTER SYMPTOMS
COUGH: 1
ABDOMINAL PAIN: 0
NAUSEA: 0
SHORTNESS OF BREATH: 0
VOMITING: 0
WHEEZING: 0
BACK PAIN: 0

## 2020-03-22 NOTE — ED PROVIDER NOTES
Kyra Ford MD  Attending Emergency  Physician               Jameson Coleman MD  03/22/20 Sudarshan Rodriguez MD  03/22/20 3267
Negative for chest pain, palpitations and leg swelling. Gastrointestinal: Negative for abdominal pain, nausea and vomiting. Musculoskeletal: Negative for back pain and neck pain. Skin: Negative for rash and wound. Neurological: Negative for dizziness, syncope, weakness, light-headedness, numbness and headaches. PHYSICAL EXAM   (up to 7 for level 4, 8 or more forlevel 5)      INITIAL VITALS:   ED Triage Vitals [03/22/20 1653]   BP Temp Temp Source Pulse Resp SpO2 Height Weight   126/64 98.6 °F (37 °C) Oral 76 18 100 % 5' 5\" (1.651 m) 180 lb (81.6 kg)       Physical Exam  Vitals signs and nursing note reviewed. Constitutional:       General: She is not in acute distress. Appearance: Normal appearance. She is not ill-appearing, toxic-appearing or diaphoretic. HENT:      Head: Normocephalic and atraumatic. Cardiovascular:      Rate and Rhythm: Normal rate and regular rhythm. Heart sounds: No murmur. No gallop. Pulmonary:      Effort: Pulmonary effort is normal. No respiratory distress. Breath sounds: Normal breath sounds. No stridor. No wheezing, rhonchi or rales. Abdominal:      General: There is no distension. Palpations: Abdomen is soft. Tenderness: There is no abdominal tenderness. There is no guarding. Musculoskeletal:      Right lower leg: No edema. Skin:     General: Skin is warm and dry. Neurological:      Mental Status: She is alert. DIFFERENTIAL  DIAGNOSIS     PLAN (LABS / IMAGING / EKG):  Orders Placed This Encounter   Procedures    Rapid influenza A/B antigens    XR CHEST PORTABLE       MEDICATIONS ORDERED:  No orders of the defined types were placed in this encounter. DDX: Influenza, viral illness, pneumonia    Initial MDM/Plan/ED course: 46 y.o. female who presents with cough and fever.   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

## 2020-03-22 NOTE — ED NOTES
Mode of arrival:  Drove self in      Residence prior to admit: home      Chief complaint on admission: chest pain, SOB  Symptoms started today around 1000 and 1330. Pt states that she has had a dry cough and fatigue for the last 2 days. She came into work at [employer], had a temp, and was sent home. Left sided chest pain and it did wake her out of her sleep. Pt is Aox4 and ambulates per self. C= \"Have you ever felt that you should Cut down on your drinking? \"  No  A= \"Have people Annoyed you by criticizing your drinking? \"  No  G= \"Have you ever felt bad or Guilty about your drinking? \"  No  E= \"Have you ever had a drink as an Eye-opener first thing in the morning to steady your nerves or to help a hangover? \"  No      Deferred []      Reason for deferring: N/A    *If yes to two or more: probable alcohol abuse. [address] [facility], RN  03/22/20 2788

## 2020-03-22 NOTE — TELEPHONE ENCOUNTER
VM message returned. Patient was sent home today from work with a temperature. She reports the following:  3/20 weakness and cough that is non productive  3/22 temperature of 100.0-100.5 and chest pain that is present when she is not coughing- (non cardiac)    Protocol recommendation to be seen in the ED and care advice shared.       Reason for Disposition   Chest pain  (Exception: MILD central chest pain, present only when coughing)    Protocols used: INFLUENZA - SEASONAL-ADULT-

## 2020-06-13 ENCOUNTER — NURSE TRIAGE (OUTPATIENT)
Dept: OTHER | Facility: CLINIC | Age: 40
End: 2020-06-13

## 2020-06-17 ENCOUNTER — HOSPITAL ENCOUNTER (OUTPATIENT)
Age: 40
Discharge: HOME | End: 2020-06-17
Payer: MEDICARE

## 2020-06-17 PROCEDURE — U0004 COV-19 TEST NON-CDC HGH THRU: HCPCS

## 2020-06-19 LAB
SARS-COV-2, PCR: NOT DETECTED
SARS-COV-2, RAPID: NORMAL
SARS-COV-2: NORMAL
SOURCE: NORMAL

## 2020-06-20 ENCOUNTER — TELEPHONE (OUTPATIENT)
Dept: PRIMARY CARE CLINIC | Age: 40
End: 2020-06-20

## 2020-06-22 ENCOUNTER — HOSPITAL ENCOUNTER (OUTPATIENT)
Dept: CT IMAGING | Age: 40
Discharge: HOME | End: 2020-06-24
Payer: MEDICARE

## 2020-06-22 ENCOUNTER — HOSPITAL ENCOUNTER (OUTPATIENT)
Age: 40
Discharge: HOME | End: 2020-06-22
Payer: MEDICARE

## 2020-06-22 LAB
ABSOLUTE EOS #: 0.1 K/UL (ref 0–0.4)
ABSOLUTE IMMATURE GRANULOCYTE: ABNORMAL K/UL (ref 0–0.3)
ABSOLUTE LYMPH #: 1.3 K/UL (ref 1–4.8)
ABSOLUTE MONO #: 0.4 K/UL (ref 0.1–1.3)
ALBUMIN SERPL-MCNC: 3.8 G/DL (ref 3.5–5.2)
ALBUMIN/GLOBULIN RATIO: ABNORMAL (ref 1–2.5)
ALP BLD-CCNC: 54 U/L (ref 35–104)
ALT SERPL-CCNC: 7 U/L (ref 5–33)
AMYLASE: 80 U/L (ref 28–100)
ANION GAP SERPL CALCULATED.3IONS-SCNC: 10 MMOL/L (ref 9–17)
AST SERPL-CCNC: 11 U/L
BASOPHILS # BLD: 1 % (ref 0–2)
BASOPHILS ABSOLUTE: 0 K/UL (ref 0–0.2)
BILIRUB SERPL-MCNC: 0.31 MG/DL (ref 0.3–1.2)
BUN BLDV-MCNC: 11 MG/DL (ref 6–20)
BUN/CREAT BLD: ABNORMAL (ref 9–20)
CALCIUM SERPL-MCNC: 9.1 MG/DL (ref 8.6–10.4)
CHLORIDE BLD-SCNC: 105 MMOL/L (ref 98–107)
CO2: 25 MMOL/L (ref 20–31)
CREAT SERPL-MCNC: 0.75 MG/DL (ref 0.5–0.9)
DIFFERENTIAL TYPE: ABNORMAL
EOSINOPHILS RELATIVE PERCENT: 1 % (ref 0–4)
GFR AFRICAN AMERICAN: >60 ML/MIN
GFR NON-AFRICAN AMERICAN: >60 ML/MIN
GFR SERPL CREATININE-BSD FRML MDRD: ABNORMAL ML/MIN/{1.73_M2}
GFR SERPL CREATININE-BSD FRML MDRD: ABNORMAL ML/MIN/{1.73_M2}
GLUCOSE BLD-MCNC: 120 MG/DL (ref 70–99)
HCT VFR BLD CALC: 40.9 % (ref 36–46)
HEMOGLOBIN: 13.4 G/DL (ref 12–16)
IMMATURE GRANULOCYTES: ABNORMAL %
LIPASE: 73 U/L (ref 13–60)
LYMPHOCYTES # BLD: 26 % (ref 24–44)
MCH RBC QN AUTO: 28.2 PG (ref 26–34)
MCHC RBC AUTO-ENTMCNC: 32.7 G/DL (ref 31–37)
MCV RBC AUTO: 86.5 FL (ref 80–100)
MONOCYTES # BLD: 8 % (ref 1–7)
NRBC AUTOMATED: ABNORMAL PER 100 WBC
PDW BLD-RTO: 13.2 % (ref 11.5–14.9)
PLATELET # BLD: 194 K/UL (ref 150–450)
PLATELET ESTIMATE: ABNORMAL
PMV BLD AUTO: 7.9 FL (ref 6–12)
POTASSIUM SERPL-SCNC: 4.1 MMOL/L (ref 3.7–5.3)
RBC # BLD: 4.73 M/UL (ref 4–5.2)
RBC # BLD: ABNORMAL 10*6/UL
SEG NEUTROPHILS: 64 % (ref 36–66)
SEGMENTED NEUTROPHILS ABSOLUTE COUNT: 3.2 K/UL (ref 1.3–9.1)
SODIUM BLD-SCNC: 140 MMOL/L (ref 135–144)
TOTAL PROTEIN: 6.6 G/DL (ref 6.4–8.3)
WBC # BLD: 5 K/UL (ref 3.5–11)
WBC # BLD: ABNORMAL 10*3/UL

## 2020-06-22 PROCEDURE — 2580000003 HC RX 258: Performed by: FAMILY MEDICINE

## 2020-06-22 PROCEDURE — 74177 CT ABD & PELVIS W/CONTRAST: CPT

## 2020-06-22 PROCEDURE — 83690 ASSAY OF LIPASE: CPT

## 2020-06-22 PROCEDURE — 6360000004 HC RX CONTRAST MEDICATION: Performed by: FAMILY MEDICINE

## 2020-06-22 PROCEDURE — 85025 COMPLETE CBC W/AUTO DIFF WBC: CPT

## 2020-06-22 PROCEDURE — 80053 COMPREHEN METABOLIC PANEL: CPT

## 2020-06-22 PROCEDURE — 82150 ASSAY OF AMYLASE: CPT

## 2020-06-22 PROCEDURE — 36415 COLL VENOUS BLD VENIPUNCTURE: CPT

## 2020-06-22 RX ORDER — 0.9 % SODIUM CHLORIDE 0.9 %
80 INTRAVENOUS SOLUTION INTRAVENOUS ONCE
Status: COMPLETED | OUTPATIENT
Start: 2020-06-22 | End: 2020-06-22

## 2020-06-22 RX ORDER — SODIUM CHLORIDE 0.9 % (FLUSH) 0.9 %
10 SYRINGE (ML) INJECTION PRN
Status: DISCONTINUED | OUTPATIENT
Start: 2020-06-22 | End: 2020-06-25 | Stop reason: HOSPADM

## 2020-06-22 RX ADMIN — Medication 10 ML: at 11:14

## 2020-06-22 RX ADMIN — SODIUM CHLORIDE 80 ML: 9 INJECTION, SOLUTION INTRAVENOUS at 11:14

## 2020-06-22 RX ADMIN — IOPAMIDOL 75 ML: 755 INJECTION, SOLUTION INTRAVENOUS at 11:14

## 2020-07-11 ENCOUNTER — NURSE TRIAGE (OUTPATIENT)
Dept: OTHER | Facility: CLINIC | Age: 40
End: 2020-07-11

## 2020-07-11 NOTE — TELEPHONE ENCOUNTER
Reason for Disposition   Message left on identified voicemail    Protocols used: NO CONTACT OR DUPLICATE CONTACT CALL-ADULT-[place]    Attempted to return call to pt. Employee had called regarding fever on Thursday and Friday, no fever today. No answer, left message to call back if still needs assistance.

## 2020-07-22 ENCOUNTER — HOSPITAL ENCOUNTER (OUTPATIENT)
Age: 40
Discharge: HOME | End: 2020-07-22
Payer: MEDICARE

## 2020-07-22 PROCEDURE — U0003 INFECTIOUS AGENT DETECTION BY NUCLEIC ACID (DNA OR RNA); SEVERE ACUTE RESPIRATORY SYNDROME CORONAVIRUS 2 (SARS-COV-2) (CORONAVIRUS DISEASE [COVID-19]), AMPLIFIED PROBE TECHNIQUE, MAKING USE OF HIGH THROUGHPUT TECHNOLOGIES AS DESCRIBED BY CMS-2020-01-R: HCPCS

## 2020-07-24 ENCOUNTER — TELEPHONE (OUTPATIENT)
Dept: GASTROENTEROLOGY | Age: 40
End: 2020-07-24

## 2020-07-28 LAB — SARS-COV-2, NAA: NOT DETECTED

## 2020-09-01 ENCOUNTER — OFFICE VISIT (OUTPATIENT)
Dept: OBGYN CLINIC | Age: 40
End: 2020-09-01
Payer: MEDICARE

## 2020-09-01 ENCOUNTER — HOSPITAL ENCOUNTER (OUTPATIENT)
Age: 40
Setting detail: SPECIMEN
Discharge: HOME | End: 2020-09-01
Payer: MEDICARE

## 2020-09-01 VITALS
WEIGHT: 202 LBS | HEIGHT: 65 IN | TEMPERATURE: 97.7 F | SYSTOLIC BLOOD PRESSURE: 106 MMHG | DIASTOLIC BLOOD PRESSURE: 80 MMHG | BODY MASS INDEX: 33.66 KG/M2

## 2020-09-01 LAB
DIRECT EXAM: NORMAL
Lab: NORMAL
SPECIMEN DESCRIPTION: NORMAL

## 2020-09-01 PROCEDURE — 87480 CANDIDA DNA DIR PROBE: CPT

## 2020-09-01 PROCEDURE — G0145 SCR C/V CYTO,THINLAYER,RESCR: HCPCS

## 2020-09-01 PROCEDURE — 87510 GARDNER VAG DNA DIR PROBE: CPT

## 2020-09-01 PROCEDURE — 99395 PREV VISIT EST AGE 18-39: CPT | Performed by: NURSE PRACTITIONER

## 2020-09-01 PROCEDURE — 87660 TRICHOMONAS VAGIN DIR PROBE: CPT

## 2020-09-01 PROCEDURE — 87591 N.GONORRHOEAE DNA AMP PROB: CPT

## 2020-09-01 PROCEDURE — 87491 CHLMYD TRACH DNA AMP PROBE: CPT

## 2020-09-01 PROCEDURE — 87624 HPV HI-RISK TYP POOLED RSLT: CPT

## 2020-09-01 RX ORDER — DICYCLOMINE HCL 20 MG
TABLET ORAL
COMMUNITY
Start: 2020-07-15

## 2020-09-01 RX ORDER — OMEPRAZOLE 10 MG/1
CAPSULE, DELAYED RELEASE ORAL
COMMUNITY
Start: 2020-06-08

## 2020-09-01 RX ORDER — BUSPIRONE HYDROCHLORIDE 5 MG/1
TABLET ORAL
COMMUNITY
Start: 2020-07-15

## 2020-09-01 ASSESSMENT — PATIENT HEALTH QUESTIONNAIRE - PHQ9
SUM OF ALL RESPONSES TO PHQ9 QUESTIONS 1 & 2: 0
1. LITTLE INTEREST OR PLEASURE IN DOING THINGS: 0
2. FEELING DOWN, DEPRESSED OR HOPELESS: 0
SUM OF ALL RESPONSES TO PHQ QUESTIONS 1-9: 0
SUM OF ALL RESPONSES TO PHQ QUESTIONS 1-9: 0

## 2020-09-01 NOTE — PROGRESS NOTES
History and Physical  [address], [address] [address], [address] [facility] [phone]   Fax [phone]  [address] [place] [address]  2020              45 y.o. Chief Complaint   Patient presents with    Gynecologic Exam       Patient's last menstrual period was 2020 (approximate). Primary Care Physician: Michele Nance DO    The patient was seen and examined. She has no chief complaint today and is here for her annual exam.  Her bowels are regular. There are no voiding complaints. She denies any bloating. She denies vaginal discharge and was counseled on STD's and the need for barrier contraception. HPI : [id number] [facility] Ant is a 45 y.o. female [id number]    Annual exam  Patient complaining of pelvic and abdominal pain. Patient reports pain occurs 5 days out of 7. Went to her PCP at end of  and had negative CT abdomen and pelvis  2020. Hx of LEEP, TL and ablation at [facility] 2018. Was diagnosed at this time with endometriosis. Since ablation- periods are better but still has monthly menses- but lasting 3-4 days long. Complaining of increased pelvic pain for past 4 months. Been with same partner for past 21 years. ________________________________________________________________________  OB History    Para Term  AB Living   5 2 2 0 3 2   SAB TAB Ectopic Molar Multiple Live Births   2 1 0 0 0 2      # Outcome Date GA Lbr Joe/2nd Weight Sex Delivery Anes PTL Lv   5 Term 17 38w4d 03:23 / 00:02 7 lb 1 oz (3.204 kg) F Vag-Spont EPI N JAEL      Name: Inge Jones: 9  Apgar5: 9   4 Term 11/10/14 40w0d  7 lb 2.6 oz (3.25 kg) M Vag-Spont EPI  JAEL      Birth Comments: 14 1829    Education information given to mother and she verbalizes understanding about the following:      Infant security. Patient safety. Skin to Skin Contact for You and Your Baby. Hour for family beginnings. Benefits of breastfeeding. Apgar1: 8  Apgar5: 9   3 SAB            2 SAB            1 TAB 1998             Past Medical History:   Diagnosis Date    History of induced      History of miscarriage     x2    HPV in female     Low grade squamous intraepithelial lesion (LGSIL) on cervical Pap smear     Rh negative state in antepartum period 2014    Trichomonas in current pregnancy 2017    Treated w/ 2g Flagyl on 17 JUAN DIEGO needed 17 POSITIVE; treated with 2g Flagyl, will need JUAN DIEGO                                                                   Past Surgical History:   Procedure Laterality Date    CHOLECYSTECTOMY, LAPAROSCOPIC  2018    Dr Heron Wright CURETTAGE  ,     after sab    LASIK      LEEP  2018    [facility]    WISDOM TOOTH EXTRACTION       Family History   Problem Relation Age of Onset    Lung Cancer Paternal Grandfather     Diabetes Other     Stroke Other     Lung Cancer Other     Lung Cancer Other     Breast Cancer Neg Hx     Cancer Neg Hx     Colon Cancer Neg Hx     Eclampsia Neg Hx     Hypertension Neg Hx     Ovarian Cancer Neg Hx      Labor Neg Hx     Spont Abortions Neg Hx      Social History     Socioeconomic History    Marital status:      Spouse name: Not on file    Number of children: Not on file    Years of education: Not on file    Highest education level: Not on file   Occupational History    Not on file   Social Needs    Financial resource strain: Not on file    Food insecurity     Worry: Not on file     Inability: Not on file    Transportation needs     Medical: Not on file     Non-medical: Not on file   Tobacco Use    Smoking status: Never Smoker    Smokeless tobacco: Never Used   Substance and Sexual Activity    Alcohol use: No    Drug use: No    Sexual activity: Yes     Partners: Male   Lifestyle    Physical activity     Days per week: Not on file     Minutes per session: Not on file    Stress: Not on file   Relationships    Social connections     Talks on phone: Not on file     Gets together: Not on file     Attends Mandaeism service: Not on file     Active member of club or organization: Not on file     Attends meetings of clubs or organizations: Not on file     Relationship status: Not on file    Intimate partner violence     Fear of current or ex partner: Not on file     Emotionally abused: Not on file     Physically abused: Not on file     Forced sexual activity: Not on file   Other Topics Concern    Not on file   Social History Narrative    Not on file       MEDICATIONS:  Current Outpatient Medications   Medication Sig Dispense Refill    busPIRone (BUSPAR) 5 MG tablet take 1 tablet by mouth twice a day      dicyclomine (BENTYL) 20 MG tablet take 1 tablet by mouth four times a day      omeprazole (PRILOSEC) 10 MG delayed release capsule       Prenatal Multivit-Min-Fe-FA (PRENATAL VITAMINS) 0.8 MG TABS Take 1 tablet by mouth daily 30 tablet 12     No current facility-administered medications for this visit. ALLERGIES:  Allergies as of 09/01/2020    (No Known Allergies)       Symptoms of decreased mood absent  Symptoms of anhedonia absent    **If either question is answered in a  positive fashion then complete the PHQ9 Scoring Evaluation and make the appropriate referral**      Immunization status: stated as current, but no records available. Gynecologic History:  Menarche: 16 yo  Menopause at [address] [place] yo     Patient's last menstrual period was 08/11/2020 (approximate). Sexually Active: Yes    STD History: Yes      Permanent Sterilization: Yes TL   Reversible Birth Control: No        Hormone Replacement Exposure: No      Genetic Qualified Family History of Breast, Ovarian , Colon or Uterine Cancer: patient unsure- will try to check with family- does not talk with mother. If YES see scanned worksheet.     Preventative Health Testing:    Health Maintenance:  Health Maintenance Due   Topic Date Due    Varicella vaccine (1 of 2 - 2-dose childhood series) 06/30/1982    Cervical cancer screen  05/02/2020    Flu vaccine (1) 09/01/2020       Date of Last Pap Smear: 5/2/2019 ASCUS/HPV neg  Abnormal Pap Smear History: Yes, HX of LEEP 2018  Colposcopy History:   Date of Last Mammogram: NA  Date of Last Colonoscopy:   Date of Last Bone Density:      ________________________________________________________________________        REVIEW OF SYSTEMS:    yes   A minimum of an eleven point review of systems was completed. Review Of Systems (11 point):  Constitutional: No fever, chills or malaise; No weight change or fatigue  Head and Eyes: No vision, Headache, Dizziness or trauma in last 12 months  ENT ROS: No hearing, Tinnitis, sinus or taste problems  Hematological and Lymphatic ROS:No Lymphoma, Von Willebrand's, Hemophillia or Bleeding History  Psych ROS: No Depression, Homicidal thoughts,suicidal thoughts, or anxiety  Breast ROS: No prior breast abnormalities or lumps  Respiratory ROS: No SOB, Pneumoniae,Cough, or Pulmonary Embolism History  Cardiovascular ROS: No Chest Pain with Exertion, Palpitations, Syncope, Edema, Arrhythmia  Gastrointestinal ROS: No Indigestion, Heartburn, Nausea, vomiting, Diarrhea, Constipation,or Bowel Changes; No Bloody Stools or melena  Genito-Urinary ROS: No Dysuria, Hematuria or Nocturia. No Urinary Incontinence or Vaginal Discharge.  + pelvic pain and endometriosis  Musculoskeletal ROS: No Arthralgia, Arthritis,Gout,Osteoporosis or Rheumatism  Neurological ROS: No CVA, Migraines, Epilepsy, Seizure Hx, or Limb Weakness  Dermatological ROS: No Rash, Itching, Hives, Mole Changes or Cancer                                                                                                                                                                                                                                  PHYSICAL Exam: Constitutional:  Vitals:    09/01/20 0910   BP: 106/80   Site: Right Upper Arm   Position: Sitting   Cuff Size: Medium Adult   Temp: 97.7 °F (36.5 °C)   Weight: 202 lb (91.6 kg)   Height: 5' 5\" (1.651 m)         General Appearance: This  is a well Developed, well Nourished, well groomed female. Her BMI was reviewed. Nutritional decision making was discussed. Skin:  There was a Normal Inspection of the skin without rashes or lesions. There were no rashes. (Papular, Maculopapular, Hives, Pustular, Macular)     There were no lesions (Ulcers, Erythema, Abn. Appearing Nevi)            Lymphatic:  No Lymph Nodes were Palpable in the neck , axilla or groin.  0 # Of Lymph Nodes; Location ; Character [Normal]  [Shotty] [Tender] [Enlarged]     Neck and EENT:  The neck was supple. There were no masses   The thyroid was not enlarged and had no masses. Perrla, EOMI B/L, TMI B/L No Abnormalities. Throat inspected-No exudates or Masses, Nares Patent No Masses        Respiratory: The lungs were auscultated and found to be clear. There were no rales, rhonchi or wheezes. There was a good respiratory effort. Cardiovascular: The heart was in a regular rate and rhythm. . No S3 or S4. There was no murmur appreciated. Location, grade, and radiation are not applicable. Extremities: The patients extremities were without calf tenderness, edema, or varicosities. There was full range of motion in all four extremities. Pulses in all four extremities were appreciated and are 2/4. Abdomen: The abdomen was soft and non-tender. There were good bowel sounds in all quadrants and there was no guarding, rebound or rigidity. On evaluation there was no evidence of hepatosplenomegaly and there was no costal vertebral teodora tenderness bilaterally. No hernias were appreciated. Abdominal Scars: TL scar    Psych:   The patient had a normal Orientation to: Time, Place, Person, and Situation  There is no Mood / Affect changes    Breast:  (Chest)  normal appearance, no masses or tenderness  Self breast exams were reviewed in detail. Literature was given. Pelvic Exam:  Vulva and vagina appear normal. Bimanual exam reveals normal uterus and adnexa. Rectal Exam:  exam declined by patient          Musculosk:  Normal Gait and station was noted. Digits were evaluated without abnormal findings. Range of motion, stability and strength were evaluated and found to be appropriate for the patients age. ASSESSMENT:      45 y.o. Annual   Diagnosis Orders   1. Visit for gynecologic examination  PAP SMEAR    [facility] DIGITAL SCREEN W OR WO CAD BILATERAL   2. Screening for HPV (human papillomavirus)  PAP SMEAR   3. Pelvic pain  US PELVIS COMPLETE    US NON OB TRANSVAGINAL    VAGINITIS DNA PROBE    C.trachomatis N.gonorrhoeae DNA   4. Encounter for screening mammogram for malignant neoplasm of breast  [facility]  [address] CAD BILATERAL          Chief Complaint   Patient presents with    Gynecologic Exam          Past Medical History:   Diagnosis Date    History of induced      History of miscarriage     x2    HPV in female     Low grade squamous intraepithelial lesion (LGSIL) on cervical Pap smear     Rh negative state in antepartum period 2014    Trichomonas in current pregnancy 2017    Treated w/ 2g Flagyl on 17 JUAN DIEGO needed 17 POSITIVE; treated with 2g Flagyl, will need JAUN DIEGO         Patient Active Problem List    Diagnosis Date Noted     11/10/14 M APG 8/9 Wt 7#2 11/10/2014     Priority: High    History of induced  x 1      Priority: Low    History of miscarriage x 2      Priority: Low    H/o Low TSH level in preg 2017     TSH 0.24 early in pregnancy.  17 labs redrawn and TSH 0.73.       18 F APG 9/9 Wt 7#1     H/O Mild Pancreatitis (Lipase 106) in pregnancy 2017     Mild Pancreatitis likely secondary to cholithiasis                        -RUQ shows no evidence concerning of obstruction                        -Recommended Low fat diet                        -CMP & Lipase levels to be repeated in 1 wk              Hereditary Breast, Ovarian, Colon and Uterine Cancer screening Done. Tobacco & Secondary smoke risks reviewed; instructed on cessation and avoidance      Counseling Completed:  Preventative Health Recommendations and Follow up. The patient was informed of the recommended preventative health recommendations. 1. Annuals every year; Cytology collections per prevailing guidelines. 2. Mammograms begin every year at 35 yo if no abnormalities are found and no family history. 3. Bone density studies every 2-3 years. Begin at 71 yo. If no fracture history or osteoporosis family history. (significant). 4. Colonoscopy begin at 38 yo. Repeat every ten years if negative and no family history. 5. Calcium of 7030-8039 mg/day in split dosing  6. Vitamin D 400-800 IU/day  7. All other preventative health recommendations will be managed by the patients Primary care physician. PLAN:  Return in about 4 weeks (around 9/29/2020) for DR Bowers/ pelvic pain, hx of endometriosis. Pelvic ultrasound ordered. Pap smear and vaginal cultures collected. screening mammogram ordered. Repeat Annual every 1 year  Cervical Cytology Evaluation begins at 24years old. If Negative Cytology, Follow-up screening per current guidelines. Mammograms every 1 year. If 35 yo and last mammogram was negative. Calcium and Vitamin D dosing reviewed. Colonoscopy screening reviewed as well as onset for bone density testing. Birth control and barrier recommendations discussed. STD counseling and prevention reviewed. Gardisil counseling completed for all patients 10-35 yo. Routine health maintenance per patients PCP.   Orders Placed This Encounter   Procedures    VAGINITIS DNA PROBE     Standing Status:   Future     Standing Expiration Date:   9/1/2021   Anushka Butts C.trachomatis N.gonorrhoeae DNA     Standing Status:   Future     Standing Expiration Date:   10/1/2020    US PELVIS COMPLETE     Standing Status:   Future     Standing Expiration Date:   12/1/2020     Order Specific Question:   Reason for exam:     Answer:   pelvic pain    US NON OB TRANSVAGINAL     Standing Status:   Future     Standing Expiration Date:   3/1/2021     Order Specific Question:   Reason for exam:     Answer:   pelvic pain    FAUSTINA DIGITAL SCREEN W OR WO CAD BILATERAL     Standing Status:   Future     Standing Expiration Date:   11/1/2021     Order Specific Question:   Reason for exam:     Answer:   screening for breast cancer    PAP SMEAR     Standing Status:   Future     Standing Expiration Date:   8/25/2021     Order Specific Question:   Collection Type     Answer: Thin Prep     Order Specific Question:   Prior Abnormal Pap Test     Answer:   No     Order Specific Question:   Screening or Diagnostic     Answer:   Screening     Order Specific Question:   HPV Requested?      Answer:   Yes     Order Specific Question:   High Risk Patient     Answer:   N/A

## 2020-09-02 LAB
C TRACH DNA GENITAL QL NAA+PROBE: NEGATIVE
N. GONORRHOEAE DNA: NEGATIVE
SPECIMEN DESCRIPTION: NORMAL

## 2020-09-04 LAB
HPV SAMPLE: ABNORMAL
HPV, GENOTYPE 16: NOT DETECTED
HPV, GENOTYPE 18: NOT DETECTED
HPV, HIGH RISK OTHER: DETECTED
HPV, INTERPRETATION: ABNORMAL
SPECIMEN DESCRIPTION: ABNORMAL

## 2020-09-10 LAB — CYTOLOGY REPORT: NORMAL

## 2020-09-11 ENCOUNTER — TELEPHONE (OUTPATIENT)
Dept: OBGYN CLINIC | Age: 40
End: 2020-09-11

## 2020-09-11 NOTE — TELEPHONE ENCOUNTER
----- Message from SAMANTHA Rajput CNP sent at 9/11/2020  7:13 AM EDT -----  Pap smear neg  +HPV detected  AGE 45  Needs colposcopy scheduled  Hx of LEEP

## 2020-12-01 ENCOUNTER — TELEPHONE (OUTPATIENT)
Dept: OBGYN CLINIC | Age: 40
End: 2020-12-01

## 2020-12-07 ENCOUNTER — TELEPHONE (OUTPATIENT)
Dept: OBGYN CLINIC | Age: 40
End: 2020-12-07

## 2021-05-03 ENCOUNTER — TELEPHONE (OUTPATIENT)
Dept: OBGYN CLINIC | Age: 41
End: 2021-05-03

## 2021-05-03 NOTE — LETTER
[facility]  [facility] [address] [address]  Phone: [phone]  Fax: Vince Boston,         May 3, 2021    [address] [address]      Dear Angelo Morrell: We have made several attempts to contact you via phone to reschedule you for the Colposcopy procedure due to the abnormal Pap smear done on 9/1/20. Please call our office @ [phone] to schedule. Your care is very important to us and prolonged treatment for your abnormal pap could potentially turn into Cervical Cancer. The Colposcopy procedure gives the Physician the opportunity to view the cervix with possible biopsies and then make the appropriate decisions regarding your diagnosis.           Sincerely,        Jalil Shepherd DO

## 2021-05-20 ENCOUNTER — HOSPITAL ENCOUNTER (OUTPATIENT)
Age: 41
Discharge: HOME | End: 2021-05-20
Payer: COMMERCIAL

## 2021-05-20 LAB
ABSOLUTE EOS #: 0 K/UL (ref 0–0.4)
ABSOLUTE IMMATURE GRANULOCYTE: ABNORMAL K/UL (ref 0–0.3)
ABSOLUTE LYMPH #: 1.3 K/UL (ref 1–4.8)
ABSOLUTE MONO #: 0.5 K/UL (ref 0.1–1.3)
ALBUMIN SERPL-MCNC: 4.4 G/DL (ref 3.5–5.2)
ALBUMIN/GLOBULIN RATIO: NORMAL (ref 1–2.5)
ALP BLD-CCNC: 68 U/L (ref 35–104)
ALT SERPL-CCNC: 8 U/L (ref 5–33)
ANION GAP SERPL CALCULATED.3IONS-SCNC: 9 MMOL/L (ref 9–17)
AST SERPL-CCNC: 13 U/L
BASOPHILS # BLD: 1 % (ref 0–2)
BASOPHILS ABSOLUTE: 0 K/UL (ref 0–0.2)
BILIRUB SERPL-MCNC: 0.47 MG/DL (ref 0.3–1.2)
BUN BLDV-MCNC: 12 MG/DL (ref 6–20)
BUN/CREAT BLD: NORMAL (ref 9–20)
CALCIUM SERPL-MCNC: 9.3 MG/DL (ref 8.6–10.4)
CHLORIDE BLD-SCNC: 103 MMOL/L (ref 98–107)
CHOLESTEROL/HDL RATIO: 3.3
CHOLESTEROL: 139 MG/DL
CO2: 27 MMOL/L (ref 20–31)
CREAT SERPL-MCNC: 0.8 MG/DL (ref 0.5–0.9)
DIFFERENTIAL TYPE: ABNORMAL
EOSINOPHILS RELATIVE PERCENT: 1 % (ref 0–4)
FERRITIN: 100 UG/L (ref 13–150)
FOLATE: 10.5 NG/ML
GFR AFRICAN AMERICAN: >60 ML/MIN
GFR NON-AFRICAN AMERICAN: >60 ML/MIN
GFR SERPL CREATININE-BSD FRML MDRD: NORMAL ML/MIN/{1.73_M2}
GFR SERPL CREATININE-BSD FRML MDRD: NORMAL ML/MIN/{1.73_M2}
GLUCOSE BLD-MCNC: 85 MG/DL (ref 70–99)
HCT VFR BLD CALC: 39.3 % (ref 36–46)
HDLC SERPL-MCNC: 42 MG/DL
HEMOGLOBIN: 13.2 G/DL (ref 12–16)
IMMATURE GRANULOCYTES: ABNORMAL %
LDL CHOLESTEROL: 87 MG/DL (ref 0–130)
LYMPHOCYTES # BLD: 22 % (ref 24–44)
MCH RBC QN AUTO: 28.6 PG (ref 26–34)
MCHC RBC AUTO-ENTMCNC: 33.6 G/DL (ref 31–37)
MCV RBC AUTO: 85.2 FL (ref 80–100)
MONOCYTES # BLD: 8 % (ref 1–7)
NRBC AUTOMATED: ABNORMAL PER 100 WBC
PDW BLD-RTO: 13.2 % (ref 11.5–14.9)
PLATELET # BLD: 203 K/UL (ref 150–450)
PLATELET ESTIMATE: ABNORMAL
PMV BLD AUTO: 7 FL (ref 6–12)
POTASSIUM SERPL-SCNC: 4.2 MMOL/L (ref 3.7–5.3)
RBC # BLD: 4.61 M/UL (ref 4–5.2)
RBC # BLD: ABNORMAL 10*6/UL
SEG NEUTROPHILS: 68 % (ref 36–66)
SEGMENTED NEUTROPHILS ABSOLUTE COUNT: 4 K/UL (ref 1.3–9.1)
SODIUM BLD-SCNC: 139 MMOL/L (ref 135–144)
THYROXINE, FREE: 1.44 NG/DL (ref 0.93–1.7)
TOTAL PROTEIN: 7.2 G/DL (ref 6.4–8.3)
TRIGL SERPL-MCNC: 52 MG/DL
TSH SERPL DL<=0.05 MIU/L-ACNC: 0.74 MIU/L (ref 0.3–5)
VITAMIN B-12: 658 PG/ML (ref 232–1245)
VITAMIN D 25-HYDROXY: 23.2 NG/ML (ref 30–100)
VLDLC SERPL CALC-MCNC: NORMAL MG/DL (ref 1–30)
WBC # BLD: 5.8 K/UL (ref 3.5–11)
WBC # BLD: ABNORMAL 10*3/UL

## 2021-05-20 PROCEDURE — 82746 ASSAY OF FOLIC ACID SERUM: CPT

## 2021-05-20 PROCEDURE — 82728 ASSAY OF FERRITIN: CPT

## 2021-05-20 PROCEDURE — 84439 ASSAY OF FREE THYROXINE: CPT

## 2021-05-20 PROCEDURE — 82607 VITAMIN B-12: CPT

## 2021-05-20 PROCEDURE — 84443 ASSAY THYROID STIM HORMONE: CPT

## 2021-05-20 PROCEDURE — 80061 LIPID PANEL: CPT

## 2021-05-20 PROCEDURE — 85025 COMPLETE CBC W/AUTO DIFF WBC: CPT

## 2021-05-20 PROCEDURE — 80053 COMPREHEN METABOLIC PANEL: CPT

## 2021-05-20 PROCEDURE — 36415 COLL VENOUS BLD VENIPUNCTURE: CPT

## 2021-05-20 PROCEDURE — 82306 VITAMIN D 25 HYDROXY: CPT

## 2021-10-12 ENCOUNTER — APPOINTMENT (OUTPATIENT)
Dept: GENERAL RADIOLOGY | Age: 41
End: 2021-10-12
Payer: COMMERCIAL

## 2021-10-12 ENCOUNTER — HOSPITAL ENCOUNTER (EMERGENCY)
Age: 41
Discharge: HOME | End: 2021-10-12
Attending: EMERGENCY MEDICINE
Payer: COMMERCIAL

## 2021-10-12 VITALS
OXYGEN SATURATION: 100 % | TEMPERATURE: 98 F | RESPIRATION RATE: 16 BRPM | WEIGHT: 175 LBS | DIASTOLIC BLOOD PRESSURE: 90 MMHG | HEIGHT: 65 IN | SYSTOLIC BLOOD PRESSURE: 139 MMHG | HEART RATE: 80 BPM | BODY MASS INDEX: 29.16 KG/M2

## 2021-10-12 DIAGNOSIS — S93.602A SPRAIN OF LEFT FOOT, INITIAL ENCOUNTER: Primary | ICD-10-CM

## 2021-10-12 PROCEDURE — 99283 EMERGENCY DEPT VISIT LOW MDM: CPT

## 2021-10-12 PROCEDURE — 73630 X-RAY EXAM OF FOOT: CPT

## 2021-10-12 ASSESSMENT — PAIN DESCRIPTION - FREQUENCY: FREQUENCY: CONTINUOUS

## 2021-10-12 ASSESSMENT — PAIN DESCRIPTION - PAIN TYPE: TYPE: ACUTE PAIN

## 2021-10-12 ASSESSMENT — PAIN DESCRIPTION - DESCRIPTORS: DESCRIPTORS: STABBING

## 2021-10-12 ASSESSMENT — PAIN DESCRIPTION - LOCATION: LOCATION: FOOT

## 2021-10-12 ASSESSMENT — PAIN SCALES - GENERAL: PAINLEVEL_OUTOF10: 9

## 2021-10-12 NOTE — ED PROVIDER NOTES
[address] [facility] ED  [address] [facility] [address]  Phone: [phone]  Fax: Chioma Ayon 0946      Pt Name: Zacarias Duarte  MRN: [id number]  [place] 1980  Date of evaluation: 10/12/2021    CHIEF COMPLAINT       Chief Complaint   Patient presents with    Foot Pain     left foot- ongoing for 2 months       HISTORY OF PRESENT ILLNESS    Zacarias Duarte is a 37 y.o. female who presents to the emergency room with left foot pain for the past 2 months. She is not sure if she injured it. Pain worse with ambulating. Denies any other symptoms. Might have been injured by her dog. REVIEW OF SYSTEMS       Constitutional: No fevers or chills   Musculoskeletal: Left foot pain   Skin: No rash Left lower extremity  Neurological: No paresthesias or weakness left lower extremity    PAST MEDICAL HISTORY    has a past medical history of History of induced , History of miscarriage, HPV in female, Low grade squamous intraepithelial lesion (LGSIL) on cervical Pap smear, Rh negative state in antepartum period, and Trichomonas in current pregnancy. SURGICAL HISTORY      has a past surgical history that includes Dilation & curettage (, ); [place] tooth extraction; LASIK; Tubal ligation (2018); Cholecystectomy, laparoscopic (2018); and LEEP (2018). CURRENT MEDICATIONS       Previous Medications    BUSPIRONE (BUSPAR) 5 MG TABLET    take 1 tablet by mouth twice a day    DICYCLOMINE (BENTYL) 20 MG TABLET    take 1 tablet by mouth four times a day    OMEPRAZOLE (PRILOSEC) 10 MG DELAYED RELEASE CAPSULE        PRENATAL MULTIVIT-MIN-FE-FA (PRENATAL VITAMINS) 0.8 MG TABS    Take 1 tablet by mouth daily       ALLERGIES     has No Known Allergies. FAMILY HISTORY     She indicated that her mother is alive. She indicated that her father is alive. She indicated that her sister is alive. She indicated that her brother is alive.  She indicated that her maternal medications:  No orders of the defined types were placed in this encounter. Vitals:   -------------------------  BP (!) 139/90   Pulse 80   Temp 98 °F (36.7 °C)   Ht 5' 5\" (1.651 m)   Wt 79.4 kg (175 lb)   LMP 10/05/2021   SpO2 100%   BMI 29.12 kg/m²       5:55 PM x-ray unremarkable for fracture. Suspect strain. Follow-up with him physician for reevaluation return if worsening symptoms or any other concerns. Motrin and or Tylenol as needed for pain. The patient understands that at this time there is no evidence for a more malignant underlying process, but also understands that early in the process of an illness or injury, an emergency department workup can be falsely reassuring. Routine discharge counseling was given, and it is understood that worsening, changing or persistent symptoms should prompt an immediate call or follow up with their primary physician or return to the emergency department. The importance of appropriate follow up was also discussed. I have reviewed the disposition diagnosis. I have answered the questions and given discharge instructions. There was voiced understanding of these instructions and no further questions or complaints. CRITICAL CARE:    None    CONSULTS:    None    PROCEDURES:    None      OARRS Report if indicated             FINAL IMPRESSION      1.  Sprain of left foot, initial encounter          DISPOSITION/PLAN   DISPOSITION          CONDITION ON DISPOSITION: STABLE       PATIENT REFERRED TO:  Julio Coreas DO  [facility] [address]    Schedule an appointment as soon as possible for a visit in 1 week        DISCHARGE MEDICATIONS:  New Prescriptions    No medications on file       (Please note that portions of this note were completed with a voice recognition program.  Efforts were made to edit the dictations but occasionally words are mis-transcribed.)    Jacquie Carter DO   Attending Emergency Physician      Yaritza Bonilla Celia,   10/12/21 [address] [facility],   10/12/21 7972

## 2021-10-20 ENCOUNTER — OFFICE VISIT (OUTPATIENT)
Dept: OBGYN CLINIC | Age: 41
End: 2021-10-20
Payer: COMMERCIAL

## 2021-10-20 VITALS
DIASTOLIC BLOOD PRESSURE: 80 MMHG | HEIGHT: 65 IN | WEIGHT: 183 LBS | BODY MASS INDEX: 30.49 KG/M2 | SYSTOLIC BLOOD PRESSURE: 118 MMHG

## 2021-10-20 DIAGNOSIS — Z01.419 VISIT FOR GYNECOLOGIC EXAMINATION: Primary | ICD-10-CM

## 2021-10-20 DIAGNOSIS — Z98.890 HISTORY OF ENDOMETRIAL ABLATION: ICD-10-CM

## 2021-10-20 DIAGNOSIS — Z80.41 FAMILY HISTORY OF OVARIAN CANCER: Primary | ICD-10-CM

## 2021-10-20 DIAGNOSIS — Z11.51 SCREENING FOR HPV (HUMAN PAPILLOMAVIRUS): ICD-10-CM

## 2021-10-20 DIAGNOSIS — Z12.31 ENCOUNTER FOR SCREENING MAMMOGRAM FOR MALIGNANT NEOPLASM OF BREAST: ICD-10-CM

## 2021-10-20 DIAGNOSIS — Z98.890 H/O LEEP: ICD-10-CM

## 2021-10-20 PROCEDURE — G8484 FLU IMMUNIZE NO ADMIN: HCPCS | Performed by: NURSE PRACTITIONER

## 2021-10-20 PROCEDURE — 99396 PREV VISIT EST AGE 40-64: CPT | Performed by: NURSE PRACTITIONER

## 2021-10-20 ASSESSMENT — PATIENT HEALTH QUESTIONNAIRE - PHQ9
SUM OF ALL RESPONSES TO PHQ QUESTIONS 1-9: 0
2. FEELING DOWN, DEPRESSED OR HOPELESS: 0
SUM OF ALL RESPONSES TO PHQ QUESTIONS 1-9: 0
SUM OF ALL RESPONSES TO PHQ9 QUESTIONS 1 & 2: 0
SUM OF ALL RESPONSES TO PHQ QUESTIONS 1-9: 0
1. LITTLE INTEREST OR PLEASURE IN DOING THINGS: 0

## 2021-10-20 NOTE — PROGRESS NOTES
History and Physical  [address], [address] [facility] [address], Leandra Bennett 82. [phone]   Fax [phone]  [address] [place] [address]  10/20/2021              37 y.o. Chief Complaint   Patient presents with    Gynecologic Exam       Patient's last menstrual period was 10/10/2021. Primary Care Physician: Anirudh Vazquez DO    The patient was seen and examined. She has no chief complaint today and is here for her annual exam.  Her bowels are regular. There are no voiding complaints. She denies any bloating. She denies vaginal discharge and was counseled on STD's and the need for barrier contraception. HPI : [address] [place] [address] is a 37 y.o. female [id number]    Annual exam  No chief complaint  Hx of LEEP  Hx of TL, ablation in 2018- still having periods- but not as bad. Periods occur monthly- lasting 3-4 days. Bleeding is light to moderate. 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 Patient safety. Skin to Skin Contact for You and Your Baby. Hour for family beginnings. Benefits of breastfeeding.           Apgar1: 8  Apgar5: 9   3 SAB 2005           2 SAB            1 TAB              Past Medical History:   Diagnosis Date    History of induced      History of miscarriage     x2    HPV in female     Low grade squamous intraepithelial lesion (LGSIL) on cervical Pap smear     Rh negative state in antepartum period 2014    Trichomonas in current pregnancy 2017    Treated w/ 2g Flagyl on 17 JUAN DIEGO needed 17 POSITIVE; treated with 2g Flagyl, will need JUAN DIEGO                                                                   Past Surgical History:   Procedure Laterality Date    CHOLECYSTECTOMY, LAPAROSCOPIC  2018    [facility] CURETTAGE  , 2005    after sab    LASIK      LEEP  2018    [facility]    WISDOM TOOTH EXTRACTION       Family History   Problem Relation Age of Onset    Lung Cancer Paternal Grandfather     Diabetes Other     Stroke Other     Lung Cancer Other     Lung Cancer Other     Breast Cancer Neg Hx     Cancer Neg Hx     Colon Cancer Neg Hx     Eclampsia Neg Hx     Hypertension Neg Hx     Ovarian Cancer Neg Hx      Labor Neg Hx     Spont Abortions Neg Hx      Social History     Socioeconomic History    Marital status:      Spouse name: Not on file    Number of children: Not on file    Years of education: Not on file    Highest education level: Not on file   Occupational History    Not on file   Tobacco Use    Smoking status: Never Smoker    Smokeless tobacco: Never Used   Vaping Use    Vaping Use: Never used   Substance and Sexual Activity    Alcohol use: No    Drug use: No    Sexual activity: Yes     Partners: Male   Other Topics Concern    Not on file   Social History Narrative    Not on file     Social Determinants of Health     Financial Resource Strain:     Difficulty of Paying Living Expenses:    Food Insecurity:     Worried About Running Out of Food in the Last Year:     Ran Out of Food in the Last Year:    Transportation Needs:     Lack of Transportation (Medical):      Lack of Transportation (Non-Medical):    Physical Activity:     Days of Exercise per Week:     Minutes of Exercise per Session:    Stress:     Feeling of Stress : Last Pap Smear: 9/1/2020 neg/+HPV- did not get follow up colposcopy  Abnormal Pap Smear History: hx of LEEP  Colposcopy History:   Date of Last Mammogram: never had one  Date of Last Colonoscopy:   Date of Last Bone Density:      ________________________________________________________________________        REVIEW OF SYSTEMS:    yes   A minimum of an eleven point review of systems was completed. Review Of Systems (11 point):  Constitutional: No fever, chills or malaise; No weight change or fatigue  Head and Eyes: No vision changes, Headache, Dizziness or trauma in last 12 months  ENT ROS: No hearing, Tinnitis, sinus or taste problems  Hematological and Lymphatic ROS:No Lymphoma, Von Willebrand's, Hemophillia or Bleeding History  Psych ROS: No Depression, Homicidal thoughts,suicidal thoughts, or anxiety  Breast ROS: No breast abnormalities or lumps  Respiratory ROS: No SOB, Pneumoniae,Cough, or Pulmonary Embolism   Cardiovascular ROS: No Chest Pain with Exertion, Palpitations, Syncope, Edema, Arrhythmia  Gastrointestinal ROS: No Indigestion, Heartburn, Nausea, vomiting, Diarrhea, Constipation,or Bowel Changes; No Bloody Stools or melena  Genito-Urinary ROS: No Dysuria, Hematuria or Nocturia.  No Urinary Incontinence or Vaginal Discharge  Musculoskeletal ROS: No Arthralgia, Arthritis,Gout,Osteoporosis or Rheumatism  Neurological ROS: No CVA, Migraines, Epilepsy, Seizure Hx, or Limb Weakness  Dermatological ROS: No Rash, Itching, Hives, Mole Changes or Cancer                                                                                                                                                                                                                                  PHYSICAL Exam:     Constitutional:  Vitals:    10/20/21 0851   BP: 118/80   Site: Right Upper Arm   Position: Sitting   Cuff Size: Medium Adult   Weight: 183 lb (83 kg)   Height: 5' 5\" (1.651 m)       [facility] for Intimate Exam   Chaperone was offered and accepted as part of the rooming process.  Chaperone: Vik Starkey          General Appearance: This  is a well Developed, well Nourished, well groomed female. Her BMI was reviewed. Nutritional decision making was discussed. Skin:  There was a Normal Inspection of the skin without rashes or lesions. There were no rashes. (Papular, Maculopapular, Hives, Pustular, Macular)     There were no lesions (Ulcers, Erythema, Abn. Appearing Nevi)            Lymphatic:  No Lymph Nodes were Palpable in the neck , axilla or groin.  0 # Of Lymph Nodes; Location ; Character [Normal]  [Shotty] [Tender] [Enlarged]     Neck and EENT:  The neck was supple. There were no masses   The thyroid was not enlarged and had no masses. Perrla, EOMI B/L, TMI B/L No Abnormalities. Throat inspected-No exudates or Masses, Nares Patent No Masses        Respiratory: The lungs were auscultated and found to be clear. There were no rales, rhonchi or wheezes. There was a good respiratory effort. Cardiovascular: The heart was in a regular rate and rhythm. . No S3 or S4. There was no murmur appreciated. Location, grade, and radiation are not applicable. Extremities: The patients extremities were without calf tenderness, edema, or varicosities. There was full range of motion in all four extremities. Pulses in all four extremities were appreciated and are 2/4. Abdomen: The abdomen was soft and non-tender. There were good bowel sounds in all quadrants and there was no guarding, rebound or rigidity. On evaluation there was no evidence of hepatosplenomegaly and there was no costal vertebral teodora tenderness bilaterally. No hernias were appreciated. Abdominal Scars: TL scar    Psych: The patient had a normal Orientation to: Time, Place, Person, and Situation  There is no Mood / Affect changes    Breast:  (Chest)  normal appearance, no masses or tenderness  Self breast exams were reviewed in detail. Literature was given. Pelvic Exam:   Vulva and vagina appear normal, bimanual exam reveals normal uterus and adnexa    Rectal Exam:  exam declined by patient          Musculosk:  Normal Gait and station was noted. Digits were evaluated without abnormal findings. Range of motion, stability and strength were evaluated and found to be appropriate for the patients age. ASSESSMENT:      37 y.o. Annual   Diagnosis Orders   1. Visit for gynecologic examination  PAP Smear   2. Screening for HPV (human papillomavirus)  PAP Smear   3. Encounter for screening mammogram for malignant neoplasm of breast  [facility] DIGITAL SCREEN W OR WO CAD BILATERAL   4. H/O LEEP 2018     5. History of endometrial ablation 2018            Chief Complaint   Patient presents with    Gynecologic Exam          Past Medical History:   Diagnosis Date    History of induced      History of miscarriage     x2    HPV in female     Low grade squamous intraepithelial lesion (LGSIL) on cervical Pap smear     Rh negative state in antepartum period 2014    Trichomonas in current pregnancy 2017    Treated w/ 2g Flagyl on 17 JUAN DIEGO needed 17 POSITIVE; treated with 2g Flagyl, will need JUAN DIEGO         Patient Active Problem List    Diagnosis Date Noted    H/O LEEP 2018 10/20/2021     Priority: High    History of endometrial ablation 2018 10/20/2021     Priority: High     11/10/14 M APG 8/9 Wt 7#2 11/10/2014     Priority: High    History of induced  x 1      Priority: Low    History of miscarriage x 2      Priority: Low    H/o Low TSH level in preg 2017     TSH 0.24 early in pregnancy.  17 labs redrawn and TSH 0.73.       18 F APG 9/9 Wt 7#1     H/O Mild Pancreatitis (Lipase 106) in pregnancy 2017     Mild Pancreatitis likely secondary to cholithiasis                        -RUQ shows no evidence concerning of obstruction                        -Recommended Low fat diet -CMP & Lipase levels to be repeated in 1 wk              Hereditary Breast, Ovarian, Colon and Uterine Cancer screening Done. Tobacco & Secondary smoke risks reviewed; instructed on cessation and avoidance      Counseling Completed:  Preventative Health Recommendations and Follow up. The patient was informed of the recommended preventative health recommendations. 1. Annuals every year; Cytology collections per prevailing guidelines. 2. Mammograms begin every year at 37 yo if no abnormalities are found and no family history. 3. Bone density studies every 2-3 years. Begin at 73 yo. If no fracture history or osteoporosis family history. (significant). 4. Colonoscopy begin at 40 yo. Repeat every ten years if negative and no family history. 5. Calcium of 6422-8844 mg/day in split dosing  6. Vitamin D 400-800 IU/day  7. All other preventative health recommendations will be managed by the patients Primary care physician. PLAN:  Return in about 1 year (around 10/20/2022) for annual.   Pap smear obtained. Mammogram ordered. Referral to genetic counselor- family hx of ovarian cancer. Repeat Annual every 1 year  Cervical Cytology Evaluation begins at 24years old. If Negative Cytology, Follow-up screening per current guidelines. Mammograms every 1 year. If 37 yo and last mammogram was negative. Calcium and Vitamin D dosing reviewed. Colonoscopy screening reviewed as well as onset for bone density testing. Birth control and barrier recommendations discussed. STD counseling and prevention reviewed. Gardisil counseling completed for all patients 10-37 yo. Routine health maintenance per patients PCP.   Orders Placed This Encounter   Procedures    [facility]INA DIGITAL SCREEN W OR WO CAD BILATERAL     Standing Status:   Future     Standing Expiration Date:   10/19/2022     Order Specific Question:   Reason for exam:     Answer:   SCREENING    PAP Smear     Patient History:    Patient's last menstrual period was 10/05/2021. OBGYN Status: Having periods  Past Surgical History:  06/13/2018: CHOLECYSTECTOMY, LAPAROSCOPIC      Comment:  Dr Trang Hays  2000, 2005: DILATION AND CURETTAGE      Comment:  after sab  No date: LASIK  06/2018: LEEP  06/13/2018: TUBAL LIGATION      Comment:  Ana Paula [facility] 59  No date: WISDOM TOOTH EXTRACTION      Social History    Tobacco Use      Smoking status: Never Smoker      Smokeless tobacco: Never Used       Standing Status:   Future     Standing Expiration Date:   10/19/2022     Order Specific Question:   Collection Type     Answer: Thin Prep     Order Specific Question:   Prior Abnormal Pap Test     Answer:   Yes     Order Specific Question:   If Prior Abnormal, Give Date     Answer:   9/1/20 HPV HR+     Order Specific Question:   Prior Treatment     Answer:   None Given     Order Specific Question:   Screening or Diagnostic     Answer:   Screening     Order Specific Question:   HPV Requested? Answer:   Yes     Order Specific Question:   High Risk Patient     Answer:   N/A           The patient, Susannah Garcia is a 37 y.o. female, was seen with a total time spent of 20 minutes for the visit on this date of service by the E/M provider. The time component had both face to face and non face to face time spent in determining the total time component. Counseling and education regarding her diagnosis listed below and her options regarding those diagnoses were also included in determining her time component. Diagnosis Orders   1. Visit for gynecologic examination  PAP Smear   2. Screening for HPV (human papillomavirus)  PAP Smear   3. Encounter for screening mammogram for malignant neoplasm of breast  [facility] DIGITAL SCREEN W OR WO CAD BILATERAL   4. H/O LEEP 2018     5. History of endometrial ablation 2018          The patient had her preventative health maintenance recommendations and follow-up reviewed with her at the completion of her visit.

## 2021-10-26 DIAGNOSIS — Z01.419 VISIT FOR GYNECOLOGIC EXAMINATION: ICD-10-CM

## 2021-10-26 DIAGNOSIS — Z11.51 SCREENING FOR HPV (HUMAN PAPILLOMAVIRUS): ICD-10-CM

## 2021-11-11 ENCOUNTER — HOSPITAL ENCOUNTER (OUTPATIENT)
Dept: WOMENS IMAGING | Age: 41
Discharge: HOME | End: 2021-11-13
Payer: COMMERCIAL

## 2021-11-11 DIAGNOSIS — Z12.31 ENCOUNTER FOR SCREENING MAMMOGRAM FOR MALIGNANT NEOPLASM OF BREAST: ICD-10-CM

## 2021-11-11 PROCEDURE — 77063 BREAST TOMOSYNTHESIS BI: CPT

## 2022-01-18 ENCOUNTER — INITIAL CONSULT (OUTPATIENT)
Dept: ONCOLOGY | Age: 42
End: 2022-01-18
Payer: COMMERCIAL

## 2022-01-18 DIAGNOSIS — Z91.89 AT HIGH RISK FOR BREAST CANCER: ICD-10-CM

## 2022-01-18 DIAGNOSIS — Z80.41 FAMILY HISTORY OF OVARIAN CANCER: Primary | ICD-10-CM

## 2022-01-18 DIAGNOSIS — Z80.3 FAMILY HISTORY OF BREAST CANCER: ICD-10-CM

## 2022-01-18 PROCEDURE — 96040 PR GENETIC COUNSELING, EACH 30 MIN: CPT | Performed by: GENETIC COUNSELOR, MS

## 2022-01-18 NOTE — PROGRESS NOTES
[facility]   Hereditary Cancer Risk Assessment     Name: Betty Lema   YOB: 1980   Date of Consultation: 1/18/22     Ms. Alegria was seen at the [place] [id number] for genetic counseling on 1/18/22. Ms. Nella Lopez was referred by SAMANTHA Saravia due to her family history of cancer. PERSONAL HISTORY   Ms. Nella Lopez is a 37 y.o.  female with no personal history of cancer. Ms. Nella Lopez reports menarche at age 16, first child at age 36, and is premenopausal.     Ms. Nella Lopez has never had a hysterectomy and both ovaries are intact. Ms. Nella Lopez reports annual mammograms. She has never had a breast MRI or required a breast biopsy. FAMILY HISTORY  Ms. Nella Lopez has 1 son(s) and 1 daughter(s). She has 1 full brother and 3 maternal half sisters. Ms. Alegria's mother has a history of early stage ovarian cancer diagnosed under age 48. She has one maternal aunt and one maternal uncle, no cancer. A great grandfather had lung cancer and was a smoker. Ms. Alegria's father is living, no cancer. She reports having a paternal aunt who passed away from breast cancer in her 45s and her paternal grandmother also had breast cancer in her 57-72s. Ms. Nella Lopez reports unknown ancestry and denies any known Ashkenazi [employer] heritage. RISK ASSESSMENT   We discussed that approximately 5-10% of cancers are due to a hereditary gene mutation which causes an increased risk for certain cancers. Hereditary cancers are typically diagnosed at younger ages (under age 46y) and occur in multiple generations of a family. Multiple individuals with the same type of cancer (example: breast or colorectal) or uncommon cancers (example: ovarian, pancreatic, male breast cancer) are also features of hereditary cancers. In summary, Ms. Alegria meets the [employer] (NCCN) guidelines for genetic testing of the BRCA1/2 genes based on having a first degree relative with ovarian cancer and having two paternal relatives with breast cancer, one diagnosed under age 48. The NCCN guidelines also recognize that an individual's personal and/or family history may be explained by more than one inherited cancer syndrome. Thus, a multi-gene panel may be more efficient, more cost effecting, and increases the yield of detecting a hereditary mutation which would impact medical management. Given her personal and/or family history, we recommend testing for the following genes at minimum: GURDEEP, CHEK2, NBN, and PALB2. DISCUSSION  We discussed that the BRCA1/2 genes are the most common genes associated with hereditary breast and ovarian cancer. We also discussed that genetic testing is available for multiple other genes related to hereditary cancer. Some of these genes are known to carry a significant increased risk for several cancers including colon, breast, uterine, ovarian, stomach, and pancreatic cancer, while some of these genes are believed to have a moderate increased risk for breast and other cancers. We discussed the possibility of finding a mutation in genes with limited information to guide medical management, as well we as the possibility of identifying variants of uncertain significance (VUS). We discussed the risks, benefits, and limitations of genetic testing. Possible test results were discussed as well as potential screening and prevention strategies. Specifically, we discussed increased breast cancer surveillance by mammogram and breast MRI as well as the option of prophylactic mastectomy. We discussed the recommendation for prophylactic oophorectomy for results which suggest an increased risk for ovarian cancer. Lastly, we discussed that the results of Ms. Alegria's genetic testing may be beneficial in defining her risk for cancer as well as for her family members. SUMMARY & PLAN  1) Ms. Alegria meets the NCCN criteria for genetic testing based on her family history of ovarian cancer and early onset breast cancer. 2) Genetic testing via a multi-gene panel was recommended and offered to Ms. Alegria. 3) Ms. Alegria elected to proceed with the CancerNext Expanded + RNA Insight gene panel. 4) Ms. Letitia Gómez is aware that she will receive a notification from [facility] if the out of pocket cost for testing exceeds $100 (based on individual insurance plan) and the option to proceed with the self pay price of $249.     5) Informed consent was obtained and a blood sample was sent to [facility]. We will call Ms. Alegria with results as soon as they are available. A follow up appointment may be recommended. A summary letter with results and final medical management recommendations will be sent once available. A total of 30 minutes were spent face to face with Ms. Alegria and 50% of the time was spent educating and counseling. The [address] [place] [address] National Program would be glad to offer our assistance should you have any questions or concerns about this information. Please feel free to contact us at [phone]. [employer].  Teena Alves MS, [facility]   Licensed Genetic Counselor

## 2022-01-31 ENCOUNTER — TELEPHONE (OUTPATIENT)
Dept: ONCOLOGY | Age: 42
End: 2022-01-31

## 2022-01-31 NOTE — TELEPHONE ENCOUNTER
Attempt to reach patient regarding genetic test results; voicemail box is full and unable to leave message. Will attempt to reach patient again in near future.

## 2022-02-01 NOTE — TELEPHONE ENCOUNTER
3 [facility]   Hereditary Cancer Risk Assessment     Name: Enoc Fleming  YOB: 1980  Date of Results Disclosure: 02/01/22      HISTORY   Ms. Jeancarlos Contreras was seen for genetic counseling at the request of SAMANTHA Manriquez due to her family history of cancer. At that time, Ms. Alegria chose to pursue genetic testing via the CancerNext Expanded + RNA gene panel. These results were discussed with Ms. Alegria via telephone. A summary of Ms. Alegria's results and recommendations are below. RESULTS  [employer] CancerNext-Expanded Panel + RNAinsight: NEGATIVE - NO CLINICALLY SIGNIFICANT MUTATIONS DETECTED   This panel included the analysis of 77 genes associated with hereditary cancer including: AIP, ALK, APC, GURDEEP, BAP1, BARD1, BLM, BMPR1A, BRCA1, BRCA2, BRIP1, CDC73, CDH1, CDK4, CDKN1B, CDKN2A, CHEK2, CTNNA1, DICER1, EGFR, EGLN1, EPCAM, FANCC, FH, FLCN, GALNT12, GREM1, HOXB13, KIF1B, KIT1, LZTR1, MAX, MEN1, MET, MITF, MLH1, MSH2, MSH3, MSH6, MUTYH, NBN, NF1, NF2, NTHL1, PALB2, PDGFRA, PHOX2B, PMS2, POLD1, POLE, POT1, FNEMX2D, PTCH1, PTEN, RAD51C, RAD51D, RB1, RECQL, RET, SDHA, SDHAF2, SDHB, SDHC, ,SDHD, SMAD4, SMARCA4, SMARCB1, SMARCE1, STK11, SUFU, XAHS682, TP53, TSC1, TSC2, VHL, and XRCC2. In addition, no clinically relevant aberrant RNA transcripts were detected in select genes. Please refer to genetic test report for technical details. We discussed that Ms. Alegria's negative test result greatly reduces the likelihood that she carries a hereditary gene mutation. However, it is possible that her family history of cancer is due to a hereditary mutation which she did not inherit. It is also possible that her family history of cancer may be due to a gene for which testing was not performed or which has yet to be discovered. RECOMMENDATIONS  1) While Ms. Jeancarlos Contreras does not carry a known hereditary gene mutation, her risk for breast cancer may still be elevated due to her remaining family history of breast cancer. Based on Ms. Alegria's personal risk factors and family history of breast cancer, her estimated lifetime risk for breast cancer is 24.3% according to the [employer] risk model. The [employer] (NCCN) recommends that women with a lifetime risk of breast cancer 20% or higher consider the following screening and risk reducing options:     NCCN Recommendation Age to Begin Frequency    Breast awareness - Women should be familiar with their breasts and promptly report changes to their healthcare provider. Periodic, consistent breast self-examination (BSE) may be beneficial  Individualized  N/A    Clinical Breast Examination  Individualized Every 6-12 months   Breast MRI with contrast  37years old  Annual   Mammogram (consider tomosynthesis)  37years old  Annual    Consider risk reducing agents (i.e. Tamoxifen)  Individualized  N/A    *age to begin screening is based on the onset of breast cancer in Ms. Alegria's family    2) Ms. Bairds risk for ovarian cancer may still be slightly elevated given that she has a first degree relative with ovarian cancer. We encourage Ms. Alegria to be familiar with gynecologic cancer symptoms, including abnormal bleeding, pelvic/abdominal pain, bloating, changes in appetite and urinary frequency. Consensus guidelines do not currently support routine ovarian cancer screening. Some clinicians may follow women with high risk factors, such as family history, using cancer antigen 125 (CA-125) monitoring and transvaginal ultrasound; however, routine screening has not been proven to be beneficial. We recommend that Ms. Alegria discuss her family history of ovarian cancer with her physicians and continue gynecologic cancer screening as directed. 3) Ms. Alegria should continue general population cancer screening guidelines as directed by her physicians.      RECOMMENDATIONS FOR FAMILY MEMBERS   1) Genetic testing is not recommended for Ms. Alegria's children based on her negative test results. However, this recommendation does not take into consideration any family history of cancer in their paternal family. 2) It is possible that the cancers in Ms. Alegria's family are due to a hereditary gene mutation that she did not inherit. Therefore, her relatives (particularly those with a current or previous cancer diagnosis) may consider genetic counseling and testing to clarify this possibility. Relatives may contact the [address] [place] [address] [facility] at [phone] or locate a genetic counselor at www. [email or web address].     3) We encourage Ms. Alegria's relatives to discuss their family history of cancer with their physicians to determine the most appropriate cancer screening recommendations. Ms. Alegria's female relatives may benefit from a formal breast cancer risk assessment by the [employer] or [employer] risk models to determine if additional breast cancer screening is warranted. SUMMARY & PLAN   1) Ms. Bairds genetic test results are negative meaning there were no clinically significant mutations detected in the 77 genes analyzed. 2) Ms. Alegria's lifetime risk for breast cancer is 24% according to the [employer]ana[employer] Ziggy risk model. She may consider the NCCN guidelines outlined above for breast cancer surveillance. This includes annual breast MRI screening staggered 6 months apart from annual mammograms. She plans to discuss this with her referring provider. She would be due for a breast MRI in [place]an Agar 2022 if she decides she would like to proceed with screening. 3) There are no other changes in medical management for Ms. Alegria based on her negative genetic test results. 4) We encourage Ms. Alegria to contact us every 1-2 years to determine if there are any new genetic testing or research options available. 5) We encourage Ms. Alegria to contact us with updates to her personal and/or familys cancer history as this information may alter our assessment and/or recommendations. The [address] [place] [address] National Program would be glad to offer our assistance should you have any questions or concerns about this information. Please feel free to contact us at [phone]. Nato Ibanez.  Brittanie Stephen, MS, General acute hospital   Licensed Genetic Counselor         CC:  Ms. Yasir Zhao, APRN-CNP

## 2022-10-21 PROBLEM — F41.9 ANXIETY: Status: ACTIVE | Noted: 2022-10-21

## 2022-10-21 PROBLEM — K21.9 GASTROESOPHAGEAL REFLUX DISEASE WITHOUT ESOPHAGITIS: Status: ACTIVE | Noted: 2022-10-21

## 2022-12-19 ENCOUNTER — OFFICE VISIT (OUTPATIENT)
Dept: OBGYN CLINIC | Age: 42
End: 2022-12-19
Payer: MEDICAID

## 2022-12-19 VITALS
DIASTOLIC BLOOD PRESSURE: 70 MMHG | SYSTOLIC BLOOD PRESSURE: 116 MMHG | WEIGHT: 161 LBS | HEIGHT: 65 IN | BODY MASS INDEX: 26.82 KG/M2

## 2022-12-19 DIAGNOSIS — Z12.31 ENCOUNTER FOR SCREENING MAMMOGRAM FOR MALIGNANT NEOPLASM OF BREAST: ICD-10-CM

## 2022-12-19 DIAGNOSIS — Z11.51 SPECIAL SCREENING EXAMINATION FOR HUMAN PAPILLOMAVIRUS (HPV): ICD-10-CM

## 2022-12-19 DIAGNOSIS — Z01.419 WELL FEMALE EXAM WITH ROUTINE GYNECOLOGICAL EXAM: Primary | ICD-10-CM

## 2022-12-19 PROCEDURE — 99396 PREV VISIT EST AGE 40-64: CPT | Performed by: NURSE PRACTITIONER

## 2022-12-19 ASSESSMENT — PATIENT HEALTH QUESTIONNAIRE - PHQ9
SUM OF ALL RESPONSES TO PHQ9 QUESTIONS 1 & 2: 0
SUM OF ALL RESPONSES TO PHQ QUESTIONS 1-9: 0
SUM OF ALL RESPONSES TO PHQ QUESTIONS 1-9: 0
2. FEELING DOWN, DEPRESSED OR HOPELESS: 0
SUM OF ALL RESPONSES TO PHQ QUESTIONS 1-9: 0
SUM OF ALL RESPONSES TO PHQ QUESTIONS 1-9: 0
1. LITTLE INTEREST OR PLEASURE IN DOING THINGS: 0

## 2022-12-19 NOTE — PROGRESS NOTES
without esophagitis 10/21/2022    History of induced      History of miscarriage     x2    HPV in female     Low grade squamous intraepithelial lesion (LGSIL) on cervical Pap smear     Rh negative state in antepartum period 2014    Trichomonas in current pregnancy 2017    Treated w/ 2g Flagyl on 17 JUAN DIEGO needed 17 POSITIVE; treated with 2g Flagyl, will need JUAN DIEGO                                                                   Past Surgical History:   Procedure Laterality Date    CHOLECYSTECTOMY, LAPAROSCOPIC  2018    Dr Gurjit Jarrell  2005    after sab    LASIK      LEEP  2018    TUBAL LIGATION  2018    [address]    WISDOM TOOTH EXTRACTION       Family History   Problem Relation Age of Onset    Cancer Mother         ovarian ca, early stage, diagnosed under age 48    Breast Cancer Paternal Grandmother         60-70s    Lung Cancer Paternal Grandfather         +TOB    Diabetes Other     Stroke Other     Lung Cancer Other         +TOB    Lung Cancer Other         +TOB    Breast Cancer Paternal Aunt         45s    Colon Cancer Neg Hx     Eclampsia Neg Hx     Hypertension Neg Hx     Ovarian Cancer Neg Hx      Labor Neg Hx     Spont Abortions Neg Hx      Social History     Socioeconomic History    Marital status:      Spouse name: Not on file    Number of children: Not on file    Years of education: Not on file    Highest education level: Not on file   Occupational History    Not on file   Tobacco Use    Smoking status: Never    Smokeless tobacco: Never   Vaping Use    Vaping Use: Never used   Substance and Sexual Activity    Alcohol use: No    Drug use: No    Sexual activity: Yes     Partners: Male   Other Topics Concern    Not on file   Social History Narrative    Not on file     Social Determinants of Health     Financial Resource Strain: Not on file   Food Insecurity: Not on file   Transportation Needs: Not on file   Physical Activity: Not on file   Stress: Not on file   Social Connections: Not on file   Intimate Partner Violence: Not on file   Housing Stability: Not on file       MEDICATIONS:  Current Outpatient Medications   Medication Sig Dispense Refill    busPIRone (BUSPAR) 5 MG tablet take 1 tablet by mouth twice a day      dicyclomine (BENTYL) 20 MG tablet take 1 tablet by mouth four times a day      omeprazole (PRILOSEC) 10 MG delayed release capsule        No current facility-administered medications for this visit. ALLERGIES:  Allergies as of 12/19/2022    (No Known Allergies)       Symptoms of decreased mood absent  Symptoms of anhedonia absent    **If either question is answered in a  positive fashion then complete the PHQ9 Scoring Evaluation and make the appropriate referral**      Immunization status: stated as current, but no records available. Gynecologic History:  Menarche: 13 yo  Menopause at [age] yo     No LMP recorded. Sexually Active: Yes    STD History: No     Permanent Sterilization: Yes tL   Reversible Birth Control: No        Hormone Replacement Exposure: No      Genetic Qualified Family History of Breast, Ovarian , Colon or Uterine Cancer: Yes (mom with ovarian cancer- prior to age 48) completed genetic counseling/ testing: negative 1/2022     If YES see scanned worksheet.     Preventative Health Testing:    Health Maintenance:  Health Maintenance Due   Topic Date Due    COVID-19 Vaccine (1) Never done    Varicella vaccine (1 of 2 - 2-dose childhood series) Never done    Hepatitis C screen  Never done    Depression Screen  10/20/2022       Date of Last Pap Smear: 10/20/2021 ASCUS/ negative  Abnormal Pap Smear History: hx of LEEP   Colposcopy History:   Date of Last Mammogram: 11/11/2021 negative  Date of Last Colonoscopy:   Date of Last Bone Density:      ________________________________________________________________________        REVIEW OF SYSTEMS:    yes   A minimum of an eleven point review of systems was completed. Review Of Systems (11 point):  Constitutional: No fever, chills or malaise; No weight change or fatigue  Head and Eyes: No vision changes, Headache, Dizziness or trauma in last 12 months  ENT ROS: No hearing, Tinnitis, sinus or taste problems  Hematological and Lymphatic ROS:No Lymphoma, Von Willebrand's, Hemophillia or Bleeding History  Psych ROS: No Depression, Homicidal thoughts,suicidal thoughts, or anxiety  Breast ROS: No breast abnormalities or lumps  Respiratory ROS: No SOB, Pneumoniae,Cough, or Pulmonary Embolism   Cardiovascular ROS: No Chest Pain with Exertion, Palpitations, Syncope, Edema, Arrhythmia  Gastrointestinal ROS: No Indigestion, Heartburn, Nausea, vomiting, Diarrhea, Constipation,or Bowel Changes; No Bloody Stools or melena  Genito-Urinary ROS: No Dysuria, Hematuria or Nocturia. No Urinary Incontinence or Vaginal Discharge  Musculoskeletal ROS: No Arthralgia, Arthritis,Gout,Osteoporosis or Rheumatism  Neurological ROS: No CVA, Migraines, Epilepsy, Seizure Hx, or Limb Weakness  Dermatological ROS: No Rash, Itching, Hives, Mole Changes or Cancer                                                                                                                                                                                                                                  PHYSICAL Exam:     Constitutional:  Vitals:    12/19/22 1603   BP: 116/70   Site: Right Upper Arm   Position: Sitting   Cuff Size: Medium Adult   Weight: 161 lb (73 kg)   Height: 5' 5\" (1.651 m)       Chaperone for Intimate Exam  Chaperone was offered and accepted as part of the rooming process. Chaperone: Janelle          General Appearance: This  is a well Developed, well Nourished, well groomed female. Her BMI was reviewed. Nutritional decision making was discussed. Skin:  There was a Normal Inspection of the skin without rashes or lesions. There were no rashes.   (Papular, Maculopapular, Hives, Pustular, Macular)     There were no lesions (Ulcers, Erythema, Abn. Appearing Nevi)            Lymphatic:  No Lymph Nodes were Palpable in the neck , axilla or groin.  0 # Of Lymph Nodes; Location ; Character [Normal]  [Shotty] [Tender] [Enlarged]     Neck and EENT:  The neck was supple. There were no masses   The thyroid was not enlarged and had no masses. Perrla, EOMI B/L, TMI B/L No Abnormalities. Throat inspected-No exudates or Masses, Nares Patent No Masses        Respiratory: The lungs were auscultated and found to be clear. There were no rales, rhonchi or wheezes. There was a good respiratory effort. Cardiovascular: The heart was in a regular rate and rhythm. . No S3 or S4. There was no murmur appreciated. Location, grade, and radiation are not applicable. Extremities: The patients extremities were without calf tenderness, edema, or varicosities. There was full range of motion in all four extremities. Pulses in all four extremities were appreciated and are 2/4. Abdomen: The abdomen was soft and non-tender. There were good bowel sounds in all quadrants and there was no guarding, rebound or rigidity. On evaluation there was no evidence of hepatosplenomegaly and there was no costal vertebral teodora tenderness bilaterally. No hernias were appreciated. Abdominal Scars: C/w previous surgeries    Psych: The patient had a normal Orientation to: Time, Place, Person, and Situation  There is no Mood / Affect changes    Breast:  (Chest)  normal appearance, no masses or tenderness, No nipple retraction or dimpling, No nipple discharge or bleeding, No axillary or supraclavicular adenopathy, Normal to palpation without dominant masses  Self breast exams were reviewed in detail. Literature was given.     Pelvic Exam:  External genitalia: normal general appearance  Urinary system: urethral meatus normal  Vaginal: normal mucosa without prolapse or lesions  Cervix: normal appearance  Adnexa: normal bimanual exam  Uterus: normal single, nontender    Rectal Exam:  exam declined by patient          Musculosk:  Normal Gait and station was noted. Digits were evaluated without abnormal findings. Range of motion, stability and strength were evaluated and found to be appropriate for the patients age. ASSESSMENT:      40 y.o. Annual   Diagnosis Orders   1. Well female exam with routine gynecological exam  PAP SMEAR      2. Special screening examination for human papillomavirus (HPV)  PAP SMEAR      3. Encounter for screening mammogram for malignant neoplasm of breast  [facility] DIGITAL SCREEN W OR WO CAD BILATERAL             Chief Complaint   Patient presents with    Annual Exam          Past Medical History:   Diagnosis Date    Anxiety 10/21/2022    Gastroesophageal reflux disease without esophagitis 10/21/2022    History of induced      History of miscarriage     x2    HPV in female     Low grade squamous intraepithelial lesion (LGSIL) on cervical Pap smear     Rh negative state in antepartum period 2014    Trichomonas in current pregnancy 2017    Treated w/ 2g Flagyl on 17 JUAN DIEGO needed 17 POSITIVE; treated with 2g Flagyl, will need JUAN DIEGO         Patient Active Problem List    Diagnosis Date Noted    H/O LEEP 2018 10/20/2021     Priority: High    History of endometrial ablation 2018 10/20/2021     Priority: High     11/10/14 M APG 8/9 Wt 7#2 11/10/2014     Priority: High    Gastroesophageal reflux disease without esophagitis 10/21/2022     Priority: Medium    Anxiety 10/21/2022     Priority: Medium    History of induced  x 1      Priority: Low    History of miscarriage x 2      Priority: Low    H/o Low TSH level in preg 2017     TSH 0.24 early in pregnancy. 17 labs redrawn and TSH 0.73.        18 F APG 9/9 Wt 7#1     H/O Mild Pancreatitis (Lipase 106) in pregnancy 2017     Mild Pancreatitis likely secondary to cholithiasis -RUQ shows no evidence concerning of obstruction                        -Recommended Low fat diet                        -CMP & Lipase levels to be repeated in 1 wk              Hereditary Breast, Ovarian, Colon and Uterine Cancer screening Done. Tobacco & Secondary smoke risks reviewed; instructed on cessation and avoidance      Counseling Completed:  Preventative Health Recommendations and Follow up. The patient was informed of the recommended preventative health recommendations. 1. Annuals every year; Cytology collections per prevailing guidelines. 2. Mammograms begin every year at 35 yo if no abnormalities are found and no family history. 3. Bone density studies every 2-3 years. Begin at 73 yo. If no fracture history or osteoporosis family history. (significant). 4. Colonoscopy begin at 38 yo. Repeat every ten years if negative and no family history. 5. Calcium of 7509-1820 mg/day in split dosing  6. Vitamin D 400-800 IU/day  7. All other preventative health recommendations will be managed by the patients Primary care physician. PLAN:  Return in about 1 year (around 12/19/2023) for annual.  Pap smear obtained. Screening mammogram ordered. Urine pregnancy test negative in office. Instructed to follow up with PCP if nausea continues. Repeat Annual every 1 year  Cervical Cytology Evaluation begins at 24years old. If Negative Cytology, Follow-up screening per current guidelines. Mammograms every 1 year. If 35 yo and last mammogram was negative. Calcium and Vitamin D dosing reviewed. Colonoscopy screening reviewed as well as onset for bone density testing. Birth control and barrier recommendations discussed. STD counseling and prevention reviewed. Gardisil counseling completed for all patients 10-35 yo. Routine health maintenance per patients PCP.   Orders Placed This Encounter   Procedures    [facility] DIGITAL SCREEN W OR WO CAD BILATERAL     Standing Status:   Future     Standing Expiration Date:   2/19/2024     Order Specific Question:   Reason for exam:     Answer:   screening mammogram    PAP SMEAR     Patient History:    No LMP recorded. OBGYN Status: Having periods  Past Surgical History:  06/13/2018: CHOLECYSTECTOMY, LAPAROSCOPIC      Comment:  Dr Shannon Sweet  2000, 2005: DILATION AND CURETTAGE      Comment:  after sab  No date: LASIK  06/2018: LEEP  06/13/2018: TUBAL LIGATION      Comment:  [address]  No date: WISDOM TOOTH EXTRACTION      Social History    Tobacco Use      Smoking status: Never      Smokeless tobacco: Never       Standing Status:   Future     Standing Expiration Date:   12/16/2023     Order Specific Question:   Collection Type     Answer: Thin Prep     Order Specific Question:   Prior Abnormal Pap Test     Answer:   No     Order Specific Question:   Screening or Diagnostic     Answer:   Screening     Order Specific Question:   HPV Requested? Answer:   Yes     Order Specific Question:   High Risk Patient     Answer:   N/A           The patient, Dwain Velasquez is a 40 y.o. female, was seen with a total time spent of 20 minutes for the visit on this date of service by the E/M provider. The time component had both face to face and non face to face time spent in determining the total time component. Counseling and education regarding her diagnosis listed below and her options regarding those diagnoses were also included in determining her time component. Diagnosis Orders   1. Well female exam with routine gynecological exam  PAP SMEAR      2. Special screening examination for human papillomavirus (HPV)  PAP SMEAR      3. Encounter for screening mammogram for malignant neoplasm of breast  [facility] DIGITAL SCREEN W OR WO CAD BILATERAL           The patient had her preventative health maintenance recommendations and follow-up reviewed with her at the completion of her visit.

## 2022-12-20 ENCOUNTER — HOSPITAL ENCOUNTER (OUTPATIENT)
Age: 42
Setting detail: SPECIMEN
Discharge: HOME | End: 2022-12-20
